# Patient Record
Sex: FEMALE | Race: ASIAN | NOT HISPANIC OR LATINO | Employment: PART TIME | URBAN - NONMETROPOLITAN AREA
[De-identification: names, ages, dates, MRNs, and addresses within clinical notes are randomized per-mention and may not be internally consistent; named-entity substitution may affect disease eponyms.]

---
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
## 2019-01-08 ENCOUNTER — OFFICE VISIT (OUTPATIENT)
Dept: PODIATRY | Facility: CLINIC | Age: 19
End: 2019-01-08

## 2019-01-08 VITALS — HEIGHT: 62 IN | OXYGEN SATURATION: 100 % | BODY MASS INDEX: 18.95 KG/M2 | WEIGHT: 103 LBS | HEART RATE: 97 BPM

## 2019-01-08 DIAGNOSIS — L60.0 INGROWN TOENAIL: ICD-10-CM

## 2019-01-08 DIAGNOSIS — M79.674 GREAT TOE PAIN, RIGHT: Primary | ICD-10-CM

## 2019-01-08 PROCEDURE — 11750 EXCISION NAIL&NAIL MATRIX: CPT | Performed by: PODIATRIST

## 2019-01-08 PROCEDURE — 99213 OFFICE O/P EST LOW 20 MIN: CPT | Performed by: PODIATRIST

## 2019-01-08 NOTE — PROGRESS NOTES
Michela Nailsty  2000  18 y.o. female     Patient presents today with complaint of ingrown toenail right great toe.      1/9/2019  Chief Complaint   Patient presents with   • Right Foot - Ingrown Toenail       History of Present Illness    Patient presents to clinic today for right great toe pain.  She states that the toenail was growing into the skin.  This started approximately 2 weeks ago.  There is pain with applied pressure.  She rates the pain as a 1 out of 10.  She has done nothing to treat it.  She has no other pedal complaints at this time.       Past Medical History:   Diagnosis Date   • Allergic    • Constipation    • GERD (gastroesophageal reflux disease)    • Headache    • Heart murmur     9 months   • Ingrown toenail    • Strep throat          Past Surgical History:   Procedure Laterality Date   • AVULSION TOENAIL PLATE           Family History   Adopted: Yes       Allergies   Allergen Reactions   • Penicillins        Social History     Socioeconomic History   • Marital status: Single     Spouse name: Not on file   • Number of children: Not on file   • Years of education: Not on file   • Highest education level: Not on file   Social Needs   • Financial resource strain: Not on file   • Food insecurity - worry: Not on file   • Food insecurity - inability: Not on file   • Transportation needs - medical: Not on file   • Transportation needs - non-medical: Not on file   Occupational History   • Not on file   Tobacco Use   • Smoking status: Never Smoker   • Smokeless tobacco: Never Used   Substance and Sexual Activity   • Alcohol use: No   • Drug use: No   • Sexual activity: No   Other Topics Concern   • Not on file   Social History Narrative   • Not on file         Current Outpatient Medications   Medication Sig Dispense Refill   • cetirizine (zyrTEC) 10 MG tablet Take 10 mg by mouth Daily.       No current facility-administered medications for this visit.      Review of Systems   Constitutional:  "Negative.    HENT: Negative.    Respiratory: Negative.    Gastrointestinal: Negative.    Musculoskeletal:        Right great toe pain    Psychiatric/Behavioral: Negative.         OBJECTIVE    Pulse 97   Ht 157.5 cm (62.01\")   Wt 46.7 kg (103 lb)   SpO2 100%   BMI 18.83 kg/m²     Physical Exam   Constitutional: She is oriented to person, place, and time. She appears well-developed and well-nourished. No distress.   HENT:   Head: Normocephalic and atraumatic.   Nose: Nose normal.   Eyes: Conjunctivae and EOM are normal. Pupils are equal, round, and reactive to light.   Pulmonary/Chest: Effort normal. No respiratory distress. She has no wheezes.   Neurological: She is alert and oriented to person, place, and time. She displays normal reflexes.   Skin: Skin is warm and dry. Capillary refill takes less than 2 seconds.   Psychiatric: She has a normal mood and affect. Her behavior is normal.   Vitals reviewed.    Right Lower extremity:     Cardiovascular:    DP/PT pulses palpable    CFT brisk  to all digits    Musculoskeletal:  Muscle strength is 5/5 for all muscle groups tested   Rectus foot type     Dermatological:   Right hallux nail is incurvated and ingrowing on the medial border.  There is erythema and edema.  It is painful to palpation.  Mild drainage noted.  Skin is warm, dry and intact    No subcutaneous nodules or masses noted      Neurological:  Sensation intact to light touch          Nail Removal  Date/Time: 1/9/2019 7:21 AM  Performed by: Arian Moy DPM  Authorized by: Arian Moy DPM   Consent: Verbal consent obtained. Written consent obtained.  Risks and benefits: risks, benefits and alternatives were discussed  Consent given by: patient  Patient understanding: patient states understanding of the procedure being performed  Patient identity confirmed: verbally with patient  Location: right foot  Location details: right big toe  Anesthesia: digital block    Anesthesia:  Local Anesthetic: " lidocaine 2% without epinephrine    Sedation:  Patient sedated: no    Preparation: skin prepped with Betadine  Amount removed: partial (Nail plate was  from underlying nailbed with blunt dissection and removed with nail nippers and a hemostat)  Side: medial  Nail matrix removed: partial (phenol)  Dressing: antibiotic ointment and dressing applied  Patient tolerance: Patient tolerated the procedure well with no immediate complications              ASSESSMENT AND PLAN    Michela was seen today for ingrown toenail.    Diagnoses and all orders for this visit:    Great toe pain, right    Ingrown toenail      - Patient with new ingrowing toenail on the opposite side of the toe from previous visit.  - Diagnosis, prevention and treatment of ingrown toenails discussed with patient, including risks and potential benefits of nail avulsion both temporary and permanent versus simple debridement.  - Patient elected for a partial permanent nail avulsion  - Dispensed aftercare instruction sheet  - All questions were answered and the patient is in agreement with the current treatment plan.  - RTC in 2 weeks           This document has been electronically signed by Arian Moy DPM on January 9, 2019 7:20 AM     1/9/2019  7:20 AM

## 2019-01-22 ENCOUNTER — OFFICE VISIT (OUTPATIENT)
Dept: PODIATRY | Facility: CLINIC | Age: 19
End: 2019-01-22

## 2019-01-22 VITALS — BODY MASS INDEX: 18.95 KG/M2 | HEIGHT: 62 IN | HEART RATE: 107 BPM | WEIGHT: 103 LBS | OXYGEN SATURATION: 97 %

## 2019-01-22 DIAGNOSIS — L60.0 INGROWN TOENAIL: Primary | ICD-10-CM

## 2019-01-22 PROCEDURE — 99212 OFFICE O/P EST SF 10 MIN: CPT | Performed by: PODIATRIST

## 2019-01-22 NOTE — PROGRESS NOTES
Michela Nuñez  2000  18 y.o. female   Not diabetic    Patient presents today for recheck of her right great toenail avulsion.    1/22/2019  Chief Complaint   Patient presents with   • Right Foot - Follow-up       History of Present Illness    Patient presents to clinic today for follow-up of her right great toenail avulsion.  She has been soaking and dressing as instructed.  She denies pain.       Past Medical History:   Diagnosis Date   • Allergic    • Constipation    • GERD (gastroesophageal reflux disease)    • Headache    • Heart murmur     9 months   • Ingrown toenail    • Strep throat          Past Surgical History:   Procedure Laterality Date   • AVULSION TOENAIL PLATE           Family History   Adopted: Yes       Allergies   Allergen Reactions   • Penicillins        Social History     Socioeconomic History   • Marital status: Single     Spouse name: Not on file   • Number of children: Not on file   • Years of education: Not on file   • Highest education level: Not on file   Social Needs   • Financial resource strain: Not on file   • Food insecurity - worry: Not on file   • Food insecurity - inability: Not on file   • Transportation needs - medical: Not on file   • Transportation needs - non-medical: Not on file   Occupational History   • Not on file   Tobacco Use   • Smoking status: Never Smoker   • Smokeless tobacco: Never Used   Substance and Sexual Activity   • Alcohol use: No   • Drug use: No   • Sexual activity: No   Other Topics Concern   • Not on file   Social History Narrative   • Not on file         Current Outpatient Medications   Medication Sig Dispense Refill   • cetirizine (zyrTEC) 10 MG tablet Take 10 mg by mouth Daily.       No current facility-administered medications for this visit.      Review of Systems   Constitutional: Negative.    HENT: Negative.    Eyes: Negative.    Respiratory: Negative.    Gastrointestinal: Negative.    Musculoskeletal: Negative.   "  Psychiatric/Behavioral: Negative.         OBJECTIVE    Pulse 107   Ht 157.5 cm (62\")   Wt 46.7 kg (103 lb)   SpO2 97%   BMI 18.84 kg/m²     Physical Exam   Constitutional: She is oriented to person, place, and time. She appears well-developed and well-nourished. No distress.   HENT:   Head: Normocephalic and atraumatic.   Nose: Nose normal.   Pulmonary/Chest: Effort normal. No respiratory distress. She has no wheezes.   Neurological: She is alert and oriented to person, place, and time.   Skin: Skin is warm and dry. Capillary refill takes less than 2 seconds.   Psychiatric: She has a normal mood and affect. Her behavior is normal.   Vitals reviewed.    Right Lower extremity:     Cardiovascular:    DP/PT pulses palpable    CFT brisk  to all digits    Musculoskeletal:  Muscle strength is 5/5 for all muscle groups tested   Rectus foot type     Dermatological:   Right hallux nail is absent.  No signs of infection.  Skin is warm, dry and intact    No subcutaneous nodules or masses noted      Neurological:  Sensation intact to light touch          Procedures        ASSESSMENT AND PLAN    Michela was seen today for follow-up.    Diagnoses and all orders for this visit:    Ingrown toenail      - Patient is doing very well post procedure  - Continue soaking and dressing the toe until there is no drainage on the Band-Aid.  - All questions were answered   - RTC as needed          This document has been electronically signed by Arian Moy DPM on January 22, 2019 1:37 PM     1/22/2019  1:37 PM   "

## 2019-02-13 PROBLEM — J98.8 VIRAL RESPIRATORY ILLNESS: Status: ACTIVE | Noted: 2019-02-13

## 2019-02-13 PROBLEM — B97.89 VIRAL RESPIRATORY ILLNESS: Status: ACTIVE | Noted: 2019-02-13

## 2020-06-19 ENCOUNTER — OFFICE VISIT (OUTPATIENT)
Dept: OBSTETRICS AND GYNECOLOGY | Facility: CLINIC | Age: 20
End: 2020-06-19

## 2020-06-19 VITALS
DIASTOLIC BLOOD PRESSURE: 78 MMHG | HEIGHT: 62 IN | SYSTOLIC BLOOD PRESSURE: 108 MMHG | BODY MASS INDEX: 19.88 KG/M2 | WEIGHT: 108 LBS

## 2020-06-19 DIAGNOSIS — Z30.011 ENCOUNTER FOR BCP (BIRTH CONTROL PILLS) INITIAL PRESCRIPTION: Primary | ICD-10-CM

## 2020-06-19 PROCEDURE — 99212 OFFICE O/P EST SF 10 MIN: CPT | Performed by: NURSE PRACTITIONER

## 2020-06-19 RX ORDER — NORETHINDRONE ACETATE AND ETHINYL ESTRADIOL 1; .02 MG/1; MG/1
1 TABLET ORAL DAILY
Qty: 21 TABLET | Refills: 12 | Status: SHIPPED | OUTPATIENT
Start: 2020-06-19 | End: 2020-09-22 | Stop reason: SDUPTHER

## 2020-06-19 NOTE — PROGRESS NOTES
Subjective   Michela Nuñez is a 19 y.o. here for birth control     LMP: 20      Michela is  who presents today for birth control initiation. She is getting  in October and wants to start on BC. Has never been sexually active. She is interested in the pill or IUD. Pt denies migraines with aura, hx of blood clots, smoking, or high blood pressure.     Contraception   This is a new problem. The current episode started today. The problem occurs constantly. The problem has been unchanged. Pertinent negatives include no abdominal pain, chest pain, chills, fatigue, fever, nausea or rash. Nothing aggravates the symptoms. She has tried nothing for the symptoms. The treatment provided no relief.       The following portions of the patient's history were reviewed and updated as appropriate: allergies, current medications, past family history, past medical history, past social history, past surgical history and problem list.    Review of Systems   Constitutional: Negative for chills, fatigue and fever.   Respiratory: Negative for shortness of breath.    Cardiovascular: Negative for chest pain and palpitations.   Gastrointestinal: Negative for abdominal pain, constipation, diarrhea and nausea.   Genitourinary: Negative for dysuria, frequency, menstrual problem, pelvic pressure, vaginal bleeding, vaginal discharge and vaginal pain.   Skin: Negative for rash.   Neurological: Negative for headache.   Psychiatric/Behavioral: Negative for depressed mood.       Objective   Physical Exam   Constitutional: She is oriented to person, place, and time. She appears well-developed and well-nourished.   HENT:   Head: Normocephalic.   Pulmonary/Chest: Effort normal.   Abdominal: Soft.   Neurological: She is alert and oriented to person, place, and time.   Skin: Skin is warm and dry.   Psychiatric: She has a normal mood and affect. Her behavior is normal.   Nursing note and vitals reviewed.        Assessment/Plan    Michela was seen today for contraception.    Diagnoses and all orders for this visit:    Encounter for BCP (birth control pills) initial prescription  -     norethindrone-ethinyl estradiol (Loestrin 1/20, 21,) 1-20 MG-MCG per tablet; Take 1 tablet by mouth Daily.        Reviewed contraceptive options to include IUDs, Nexplanon, OCPs, Patch, and Vaginal Ring. Pt opted for OCP today; instructed to to start on her first day of her next period. Return in 3 months for f/u.

## 2020-08-18 ENCOUNTER — TELEPHONE (OUTPATIENT)
Dept: OBSTETRICS AND GYNECOLOGY | Facility: CLINIC | Age: 20
End: 2020-08-18

## 2020-08-18 NOTE — TELEPHONE ENCOUNTER
Pt called and wanted to ask you a couple questions.    She would not tell me any other information. [phone]

## 2020-08-18 NOTE — TELEPHONE ENCOUNTER
Spoke to patient regarding birth control. Currently, on her 2nd day of placebo week but the pill doesn't actually have placebo pills; is wondering when she should start her the pills again. Pt began bleeding today. Instructed to start next form 1 week of when she began her placebo week. If breakthrough bleeding persists with Loestrin, can increas LAI with higher estrogen.

## 2020-09-22 ENCOUNTER — OFFICE VISIT (OUTPATIENT)
Dept: OBSTETRICS AND GYNECOLOGY | Facility: CLINIC | Age: 20
End: 2020-09-22

## 2020-09-22 VITALS
DIASTOLIC BLOOD PRESSURE: 64 MMHG | WEIGHT: 113.2 LBS | BODY MASS INDEX: 20.83 KG/M2 | HEIGHT: 62 IN | SYSTOLIC BLOOD PRESSURE: 105 MMHG

## 2020-09-22 DIAGNOSIS — Z30.41 ENCOUNTER FOR BIRTH CONTROL PILLS MAINTENANCE: ICD-10-CM

## 2020-09-22 PROCEDURE — 99212 OFFICE O/P EST SF 10 MIN: CPT | Performed by: NURSE PRACTITIONER

## 2020-09-22 RX ORDER — NORETHINDRONE ACETATE AND ETHINYL ESTRADIOL 1; .02 MG/1; MG/1
1 TABLET ORAL DAILY
Qty: 63 TABLET | Refills: 3 | Status: SHIPPED | OUTPATIENT
Start: 2020-09-22 | End: 2021-03-08 | Stop reason: SDUPTHER

## 2020-09-22 RX ORDER — NORETHINDRONE ACETATE AND ETHINYL ESTRADIOL 1; .02 MG/1; MG/1
1 TABLET ORAL DAILY
Qty: 84 TABLET | Refills: 3 | Status: SHIPPED | OUTPATIENT
Start: 2020-09-22 | End: 2020-09-22

## 2020-09-22 NOTE — PROGRESS NOTES
Subjective   Michela Nuñez is a 19 y.o. here for birth control pill followup    Michela is her for 3 month birth control [id number] loestrin. She is having slight spotting in the 3rd week of her pills and states that she will be on her period during her wedding week in October. Her withdraw period has been light.     Contraception  This is a new problem. The current episode started more than 1 month ago. The problem occurs constantly. The problem has been gradually improving. Pertinent negatives include no abdominal pain, chest pain, chills, fatigue, fever, nausea or rash. Nothing aggravates the symptoms. She has tried nothing for the symptoms. The treatment provided no relief.       The following portions of the patient's history were reviewed and updated as appropriate: allergies, current medications, past family history, past medical history, past social history, past surgical history and problem list.    Review of Systems   Constitutional: Negative for chills, fatigue and fever.   Respiratory: Negative for shortness of breath.    Cardiovascular: Negative for chest pain and palpitations.   Gastrointestinal: Negative for abdominal pain, constipation, diarrhea and nausea.   Genitourinary: Negative for dysuria, frequency, menstrual problem, pelvic pressure, vaginal bleeding, vaginal discharge and vaginal pain.   Skin: Negative for rash.   Neurological: Negative for headache.   Psychiatric/Behavioral: Negative for depressed mood.       Objective   Physical Exam  Vitals signs and nursing note reviewed.   Constitutional:       Appearance: She is well-developed.   HENT:      Head: Normocephalic.   Neck:      Musculoskeletal: Normal range of motion and neck supple.   Pulmonary:      Effort: Pulmonary effort is normal.   Musculoskeletal: Normal range of motion.   Neurological:      Mental Status: She is alert and oriented to person, place, and time.   Psychiatric:         Behavior: Behavior normal.            Assessment/Plan   Michela was seen today for follow-up.    Diagnoses and all orders for this visit:    Encounter for birth control pills maintenance  -     Discontinue: norethindrone-ethinyl estradiol (Loestrin 1/20, 21,) 1-20 MG-MCG per tablet; Take 1 tablet by mouth Daily.  -     norethindrone-ethinyl estradiol (Loestrin 1/20, 21,) 1-20 MG-MCG per tablet; Take 1 tablet by mouth Daily.        Counseled patient that she can continue on to her next pack of pills to skip a withdrawal bleed during her wedding week. Rx changed so she can get 3 months supply at a time. Counseled on returning after she gets  for her well woman and pap test- her 20st birthday is this month.

## 2021-03-08 ENCOUNTER — OFFICE VISIT (OUTPATIENT)
Dept: OBSTETRICS AND GYNECOLOGY | Facility: CLINIC | Age: 21
End: 2021-03-08

## 2021-03-08 VITALS
WEIGHT: 121 LBS | DIASTOLIC BLOOD PRESSURE: 62 MMHG | BODY MASS INDEX: 22.26 KG/M2 | SYSTOLIC BLOOD PRESSURE: 112 MMHG | HEIGHT: 62 IN

## 2021-03-08 DIAGNOSIS — Z01.419 WOMEN'S ANNUAL ROUTINE GYNECOLOGICAL EXAMINATION: Primary | ICD-10-CM

## 2021-03-08 DIAGNOSIS — Z30.41 ENCOUNTER FOR BIRTH CONTROL PILLS MAINTENANCE: ICD-10-CM

## 2021-03-08 PROCEDURE — 99395 PREV VISIT EST AGE 18-39: CPT | Performed by: NURSE PRACTITIONER

## 2021-03-08 RX ORDER — NORETHINDRONE ACETATE AND ETHINYL ESTRADIOL 1; .02 MG/1; MG/1
1 TABLET ORAL DAILY
Qty: 63 TABLET | Refills: 4 | Status: SHIPPED | OUTPATIENT
Start: 2021-03-08 | End: 2022-01-11

## 2021-03-08 NOTE — PROGRESS NOTES
Subjective   Michela Nuñez is a 20 y.o. Annual gynecological exam    LMP: 02/23/2021  Pap: Never  BC: Loestrin    Pt presents for annual gynecological exam with no complaints.  She is doing well on oral contraception and denies any undesirable side effects.      Gynecologic Exam  The patient's pertinent negatives include no genital itching, genital lesions, genital odor, genital rash, pelvic pain, vaginal bleeding or vaginal discharge. The patient is experiencing no pain. She is not pregnant. Pertinent negatives include no abdominal pain, chills, constipation, diarrhea, dysuria, fever, flank pain, frequency, headaches, hematuria, rash or urgency. She is sexually active. No, her partner does not have an STD. She uses oral contraceptives for contraception. Her menstrual history has been regular.       The following portions of the patient's history were reviewed and updated as appropriate: allergies, current medications, past family history, past medical history, past social history, past surgical history and problem list.    Review of Systems   Constitutional: Negative for chills, diaphoresis, fatigue, fever and unexpected weight change.   Respiratory: Negative for apnea, chest tightness and shortness of breath.    Cardiovascular: Negative for chest pain and palpitations.   Gastrointestinal: Negative for abdominal distention, abdominal pain, constipation and diarrhea.   Genitourinary: Negative for decreased urine volume, difficulty urinating, dyspareunia, dysuria, enuresis, flank pain, frequency, genital sores, hematuria, menstrual problem, pelvic pain, urgency, vaginal bleeding, vaginal discharge and vaginal pain.   Skin: Negative for rash.   Neurological: Negative for headaches.   Psychiatric/Behavioral: Negative for sleep disturbance and suicidal ideas.         Objective   Physical Exam  Vitals and nursing note reviewed. Exam conducted with a chaperone present.   Constitutional:       General: She is  awake. She is not in acute distress.     Appearance: Normal appearance. She is well-developed and well-groomed. She is not ill-appearing, toxic-appearing or diaphoretic.   Neck:      Thyroid: No thyroid mass, thyromegaly or thyroid tenderness.   Cardiovascular:      Rate and Rhythm: Normal rate and regular rhythm.      Heart sounds: Normal heart sounds.   Pulmonary:      Effort: Pulmonary effort is normal.      Breath sounds: Normal breath sounds.   Chest:      Breasts: Adam Score is 5. Breasts are symmetrical.         Right: Normal. No swelling, bleeding, inverted nipple, mass, nipple discharge, skin change or tenderness.         Left: Normal. No swelling, bleeding, inverted nipple, mass, nipple discharge, skin change or tenderness.   Abdominal:      General: Bowel sounds are normal. There is no distension.      Palpations: Abdomen is soft.      Tenderness: There is no abdominal tenderness.   Genitourinary:     General: Normal vulva.      Exam position: Lithotomy position.      Adam stage (genital): 5.      Labia:         Right: No rash, tenderness, lesion or injury.         Left: No rash, tenderness, lesion or injury.       Urethra: No prolapse, urethral pain, urethral swelling or urethral lesion.      Vagina: Normal.      Cervix: Normal.      Uterus: Normal.       Adnexa: Right adnexa normal and left adnexa normal.        Right: No mass, tenderness or fullness.          Left: No mass, tenderness or fullness.        Comments: Pap smear obtained  Lymphadenopathy:      Upper Body:      Right upper body: No supraclavicular, axillary or pectoral adenopathy.      Left upper body: No supraclavicular, axillary or pectoral adenopathy.      Lower Body: No right inguinal adenopathy. No left inguinal adenopathy.   Skin:     General: Skin is warm and dry.   Neurological:      Mental Status: She is alert and oriented to person, place, and time.      Gait: Gait is intact.   Psychiatric:         Attention and Perception:  Attention and perception normal.         Mood and Affect: Mood and affect normal.         Speech: Speech normal.         Behavior: Behavior normal. Behavior is cooperative.           Assessment/Plan   Diagnoses and all orders for this visit:    1. Women's annual routine gynecological examination (Primary)  -     Liquid-based Pap Smear, Screening    2. Encounter for birth control pills maintenance  -     norethindrone-ethinyl estradiol (Loestrin 1/20, 21,) 1-20 MG-MCG per tablet; Take 1 tablet by mouth Daily.  Dispense: 63 tablet; Refill: 4      Encouraged self breast awareness.  She will receive normal pap letter via mail, if abnormal we will call pt.  RTC in 1 year for annual gynecological exam or sooner if needed.

## 2021-03-09 LAB
LAB AP CASE REPORT: NORMAL
PATH INTERP SPEC-IMP: NORMAL

## 2021-06-01 ENCOUNTER — OFFICE VISIT (OUTPATIENT)
Dept: FAMILY MEDICINE CLINIC | Facility: CLINIC | Age: 21
End: 2021-06-01

## 2021-06-01 VITALS
OXYGEN SATURATION: 98 % | HEIGHT: 62 IN | BODY MASS INDEX: 22.26 KG/M2 | HEART RATE: 95 BPM | WEIGHT: 121 LBS | DIASTOLIC BLOOD PRESSURE: 68 MMHG | SYSTOLIC BLOOD PRESSURE: 104 MMHG

## 2021-06-01 DIAGNOSIS — K21.9 GASTROESOPHAGEAL REFLUX DISEASE, UNSPECIFIED WHETHER ESOPHAGITIS PRESENT: Primary | ICD-10-CM

## 2021-06-01 PROCEDURE — 99213 OFFICE O/P EST LOW 20 MIN: CPT | Performed by: NURSE PRACTITIONER

## 2021-06-01 RX ORDER — OMEPRAZOLE 40 MG/1
40 CAPSULE, DELAYED RELEASE ORAL DAILY
Qty: 30 CAPSULE | Refills: 2 | Status: SHIPPED | OUTPATIENT
Start: 2021-06-01 | End: 2021-10-18

## 2021-06-01 NOTE — PROGRESS NOTES
Chief Complaint  Heartburn    Subjective          Michela Nuñez presents to [facility] Patient was recently seen in urgent care, was given Carafate, some relief, but still having heartburn, feeling of something stuck in her throat and sulfur burps. She is currently taking 20mg of Prilosec, and just finished her carafate.   Heartburn  She complains of belching, globus sensation, heartburn and nausea. She reports no chest pain, no coughing, no sore throat or no wheezing. This is a recurrent problem. The current episode started 1 to 4 weeks ago. The problem occurs frequently. The problem has been waxing and waning. The heartburn is located in the substernum. The heartburn is of moderate intensity. Nothing aggravates the symptoms. There are no known risk factors. She has tried a PPI for the symptoms. The treatment provided mild relief. Past procedures include an EGD. 10+ years ago, gastritis .       Review of Systems   Constitutional: Negative for activity change, appetite change and chills.   HENT: Negative for congestion, ear pain, sore throat and trouble swallowing.    Eyes: Negative for discharge, itching and visual disturbance.   Respiratory: Negative for apnea, cough and wheezing.    Cardiovascular: Negative for chest pain and leg swelling.   Gastrointestinal: Positive for heartburn and nausea. Negative for abdominal distention, constipation and diarrhea.   Endocrine: Negative for cold intolerance, heat intolerance and polyuria.   Genitourinary: Negative for dysuria, frequency and urgency.   Musculoskeletal: Negative for arthralgias, back pain and myalgias.   Skin: Negative for color change, pallor and wound.   Neurological: Negative for dizziness, seizures, syncope, weakness and light-headedness.   Psychiatric/Behavioral: Negative for agitation, confusion and sleep disturbance. The patient is not nervous/anxious.          Objective   Vital Signs:   /68   Pulse 95    "Ht 157.5 cm (62\")   Wt 54.9 kg (121 lb)   SpO2 98%   BMI 22.13 kg/m²     Physical Exam  Vitals and nursing note reviewed.   Constitutional:       Appearance: She is well-developed.   HENT:      Head: Normocephalic and atraumatic.   Eyes:      General: Lids are normal.      Conjunctiva/sclera: Conjunctivae normal.   Neck:      Thyroid: No thyroid mass or thyromegaly.      Trachea: Trachea normal. No tracheal tenderness.   Cardiovascular:      Rate and Rhythm: Normal rate.      Pulses: Normal pulses.      Heart sounds: Normal heart sounds.   Pulmonary:      Effort: Pulmonary effort is normal. No respiratory distress.      Breath sounds: Normal breath sounds. No wheezing.   Abdominal:      General: There is no distension.      Palpations: Abdomen is soft. There is no mass.   Musculoskeletal:         General: Normal range of motion.      Cervical back: Normal range of motion. No edema.   Lymphadenopathy:      Head:      Right side of head: No submental, submandibular or tonsillar adenopathy.      Left side of head: No submental, submandibular or tonsillar adenopathy.   Skin:     General: Skin is warm and dry.      Coloration: Skin is not pale.      Findings: No abrasion, erythema or lesion.   Neurological:      Mental Status: She is alert and oriented to person, place, and time.   Psychiatric:         Mood and Affect: Mood is not anxious. Affect is not inappropriate.         Speech: Speech normal.         Behavior: Behavior normal.         Thought Content: Thought content normal.         Judgment: Judgment normal. Judgment is not impulsive.        Result Review :                  Assessment and Plan    Diagnoses and all orders for this visit:    1. Gastroesophageal reflux disease, unspecified whether esophagitis present (Primary)  -     omeprazole (priLOSEC) 40 MG capsule; Take 1 capsule by mouth Daily.  Dispense: 30 capsule; Refill: 2  -     Ambulatory Referral to Gastroenterology      1. GERD  Increase Prilosec to " 40mg, once daily   Referral to Gi made, if no relief in 2-3 weeks   We discussed trying Nexium of no relief with increased  PPI, please call office if you have issues  Refrain from eating late at night, or laying flat after eating.       I spent 28 minutes caring for Michela on this date of service. This time includes time spent by me in the following activities:preparing for the visit, performing a medically appropriate examination and/or evaluation , counseling and educating the patient/family/caregiver, ordering medications, tests, or procedures, referring and communicating with other health care professionals  and documenting information in the medical record  Follow Up   Return if symptoms worsen or fail to improve, for Next scheduled follow up.  Patient was given instructions and counseling regarding her condition or for health maintenance advice. Please see specific information pulled into the AVS if appropriate.           This document has been electronically signed by KAREN Salazar on June 1, 2021 11:26 CDT

## 2021-06-15 ENCOUNTER — OFFICE VISIT (OUTPATIENT)
Dept: GASTROENTEROLOGY | Facility: CLINIC | Age: 21
End: 2021-06-15

## 2021-06-15 VITALS
HEIGHT: 62 IN | HEART RATE: 97 BPM | WEIGHT: 120 LBS | DIASTOLIC BLOOD PRESSURE: 79 MMHG | SYSTOLIC BLOOD PRESSURE: 122 MMHG | BODY MASS INDEX: 22.08 KG/M2

## 2021-06-15 DIAGNOSIS — K21.9 GASTROESOPHAGEAL REFLUX DISEASE, UNSPECIFIED WHETHER ESOPHAGITIS PRESENT: Primary | ICD-10-CM

## 2021-06-15 PROCEDURE — 99213 OFFICE O/P EST LOW 20 MIN: CPT | Performed by: NURSE PRACTITIONER

## 2021-06-15 RX ORDER — DEXTROSE AND SODIUM CHLORIDE 5; .45 G/100ML; G/100ML
30 INJECTION, SOLUTION INTRAVENOUS CONTINUOUS PRN
Status: CANCELLED | OUTPATIENT
Start: 2021-07-30

## 2021-06-15 NOTE — PATIENT INSTRUCTIONS
Food Choices for Gastroesophageal Reflux Disease, Adult  When you have gastroesophageal reflux disease (GERD), the foods you eat and your eating habits are very important. Choosing the right foods can help ease the discomfort of GERD. Consider working with a dietitian to help you make healthy food choices.  What are tips for following this plan?  Reading food labels  · Read the label for foods that are low in saturated fat. Foods that have less than 5% of daily value (DV) of fat and 0 g of trans fats may help with your symptoms.  Cooking  · Cook foods using methods other than frying. This may include baking, steaming, grilling, or broiling. These are all methods that do not need a lot of fat for cooking.  · To add flavor, try to use herbs that are low in spice and acidity.  Meal planning    · Choose healthy foods that are low in fat, such as fruits, vegetables, whole grains, low-fat dairy products, lean meats, fish, and poultry.  · Eat frequent, small meals instead of three large meals each day. Eat your meals slowly, in a relaxed setting. Avoid bending over or lying down until 2-3 hours after eating.  · Limit high-fat foods such as fatty meats or fried foods.  · Limit your intake of oils, butter, and shortening to less than 8 teaspoons each day.  · Avoid the following:  ? Foods that cause symptoms. These may be different for different people. Keep a food diary to keep track of foods that cause symptoms.  ? Alcohol.  ? Drinking large amounts of liquid with meals.  ? Eating meals during the 2-3 hours before bed.  Lifestyle  · Maintain a healthy weight. Ask your health care provider what weight is healthy for you. If you need to lose weight, work with your health care provider to do so safely.  · Exercise for at least 30 minutes on 5 or more days each week, or as told by your health care provider.  · Avoid wearing clothes that fit tightly around your waist and chest.  · Do not use any products that contain nicotine or  tobacco, such as cigarettes, e-cigarettes, and chewing tobacco. If you need help quitting, ask your health care provider.  · Sleep with the head of your bed raised. Use a wedge under the mattress or blocks under the bed frame to raise the head of the bed.  What foods should I eat?    Eat a healthy, well-balanced diet of fruits, vegetables, whole grains, low-fat dairy products, lean meats, fish, and poultry. Each person is different. Foods that may trigger symptoms in one person may not trigger any symptoms in another person. Work with your health care provider to identify foods that are safe for you.  The items listed above may not be a complete list of foods and beverages you can eat. Contact a dietitian for more information.  What foods should I avoid?  Limiting some of these foods may help in managing the symptoms of GERD. Everyone is different. Consult a dietitian or your health care provider to help you identify the exact foods to avoid, if any.  Fruits  Any fruits prepared with added fat. Any fruits that cause symptoms. For some people this may include citrus fruits, such as oranges, grapefruit, pineapple, and blair.  Vegetables  Deep-fried vegetables. French fries. Any vegetables prepared with added fat. Any vegetables that cause symptoms. For some people, this may include tomatoes and tomato products, chili peppers, onions and garlic, and horseradish.  Grains  Pastries or quick breads with added fat.  Meats and other proteins  High-fat meats, such as fatty beef or pork, hot dogs, ribs, ham, sausage, salami, and lazo. Fried meat or protein, including fried fish and fried chicken. Nuts and nut butters.  Dairy  Whole milk and chocolate milk. Sour cream. Cream. Ice cream. Cream cheese. Milkshakes.  Fats and oils  Butter. Margarine. Shortening. Ghee.  Beverages  Coffee and tea, with or without caffeine. Carbonated beverages. Sodas. Energy drinks. Fruit juice made with acidic fruits (such as orange or  grapefruit). Tomato juice. Alcoholic drinks.  Sweets and desserts  Chocolate and cocoa. Donuts.  Seasonings and condiments  Pepper. Peppermint and spearmint. Added salt. Any condiments, herbs, or seasonings that cause symptoms. For some people, this may include mike, hot sauce, or vinegar-based salad dressings.  The items listed above may not be a complete list of foods and beverages you should avoid. Contact a dietitian for more information.  Questions to ask your health care provider  Diet and lifestyle changes are usually the first steps that are taken to manage symptoms of GERD. If diet and lifestyle changes do not improve your symptoms, talk with your health care provider about taking medicines.  Where to find more information  · [employer] G[employer]intestinal [employer]: aboutgerd.org  Summary  · When you have gastroesophageal reflux disease (GERD), food and lifestyle choices may be very helpful in easing the discomfort of GERD.  · Eat frequent, small meals instead of three large meals each day. Eat your meals slowly, in a relaxed setting. Avoid bending over or lying down until 2-3 hours after eating.  · Limit high-fat foods such as fatty meat or fried foods.  This information is not intended to replace advice given to you by your health care provider. Make sure you discuss any questions you have with your health care provider.  Document Revised: 10/12/2020 Document Reviewed: 10/12/2020  Elsevier Patient Education © 2021 [employer]

## 2021-06-15 NOTE — PROGRESS NOTES
Chief Complaint   Patient presents with   • Heartburn       Subjective    Michela Nuñez is a 20 y.o. female. she is here today     20-year-old female presents to discuss worsening reflux.  She was seen in urgent care due to flare of acid indigestion given Carafate with only mild relief of symptoms continued  having frequent indigestion/heartburn seen by PCP due to feeling of globus sensation and sulfur burps and continual acid indigestion.  Prilosec was increased to 40 mg per night and she reports significant provement of symptoms over the last week.  She reports she had similar issue diagnosed with gastritis at age 9 or 10 in [place] was treated for short course with antacid medication and she has done well with diet modifications since then.    Heartburn  She complains of abdominal pain and heartburn. She reports no belching, no chest pain, no choking, no coughing, no dysphagia, no early satiety, no globus sensation, no hoarse voice or no nausea. This is a chronic problem. The problem has been gradually improving. Pertinent negatives include no fatigue. She has tried a PPI for the symptoms. The treatment provided significant relief.            The following portions of the patient's history were reviewed and updated as appropriate:   Past Medical History:   Diagnosis Date   • Allergic    • Constipation    • GERD (gastroesophageal reflux disease)    • Headache    • Heart murmur     9 months   • Ingrown toenail    • Strep throat      Past Surgical History:   Procedure Laterality Date   • AVULSION TOENAIL PLATE       Family History   Adopted: Yes     OB History    No obstetric history on file.       Prior to Admission medications    Medication Sig Start Date End Date Taking? Authorizing Provider   cetirizine (zyrTEC) 10 MG tablet Take 10 mg by mouth Daily.   Yes Provider, MD Milvia   norethindrone-ethinyl estradiol (Loestrin 1/20, 21,) 1-20 MG-MCG per tablet Take 1 tablet by mouth Daily. 3/8/21  "3/8/22 Yes Yodit Rick APRN   omeprazole (priLOSEC) 40 MG capsule Take 1 capsule by mouth Daily. 6/1/21  Yes Twyla Dueñas APRN     Allergies   Allergen Reactions   • Penicillins Hives     Social History     Socioeconomic History   • Marital status:      Spouse name: Not on file   • Number of children: Not on file   • Years of education: Not on file   • Highest education level: Not on file   Tobacco Use   • Smoking status: Never Smoker   • Smokeless tobacco: Never Used   Substance and Sexual Activity   • Alcohol use: No   • Drug use: No   • Sexual activity: Never       Review of Systems  Review of Systems   Constitutional: Negative for activity change, appetite change, chills, diaphoresis, fatigue, fever and unexpected weight change.   HENT: Negative for hoarse voice.    Respiratory: Negative for cough and choking.    Cardiovascular: Negative for chest pain.   Gastrointestinal: Positive for abdominal pain and heartburn. Negative for abdominal distention, anal bleeding, blood in stool, constipation, diarrhea, dysphagia, nausea, rectal pain and vomiting.        /79 (BP Location: Left arm)   Pulse 97   Ht 157.5 cm (62\")   Wt 54.4 kg (120 lb)   LMP 05/18/2021 (Exact Date)   BMI 21.95 kg/m²     Objective    Physical Exam  Constitutional:       General: She is not in acute distress.     Appearance: Normal appearance. She is well-developed.   HENT:      Head: Normocephalic and atraumatic.   Neck:      Thyroid: No thyromegaly.   Pulmonary:      Effort: Pulmonary effort is normal.   Abdominal:      General: Bowel sounds are normal. There is no distension.      Palpations: Abdomen is soft. Abdomen is not rigid.      Tenderness: There is abdominal tenderness in the left upper quadrant. There is no guarding.      Hernia: No hernia is present.   Musculoskeletal:      Cervical back: Normal range of motion and neck supple.   Skin:     General: Skin is warm and dry.      Coloration: Skin is not " pale.      Findings: No rash.   Neurological:      Mental Status: She is alert and oriented to person, place, and time.   Psychiatric:         Speech: Speech normal.         Behavior: Behavior is cooperative.       Office Visit on 03/08/2021   Component Date Value Ref Range Status   • Case Report 03/08/2021    Final                    Value:Gynecologic Cytology Report                       Case: [id number]                                  Authorizing Provider:  Yodit Rick       Collected:           03/08/2021 09:41 AM                                 KAREN Gilmore                                                                 Ordering Location:     [facility]     Received:            03/08/2021 01:27 PM                                 [facility]                                                                 First Screen:          Deven Pritchard                                                             Specimen:    Sendout to [facility], Cervix                                                                    • Interpretation 03/08/2021 See attached report   Final     Assessment/Plan      1. Gastroesophageal reflux disease, unspecified whether esophagitis present    .   Continue Prilosec 40 mg/day will add a patient for EGD due to left upper quadrant tenderness on exam concern for peptic ulcer or H. pylori gastritis.  Continue with standard antireflux measures and avoidance of gastric irritants.  Follow-up after test return office sooner.    Orders placed during this encounter include:  Orders Placed This Encounter   Procedures   • Follow Anesthesia Guidelines / Standing Orders     Standing Status:   Future   • Obtain Informed Consent     Standing Status:   Future     Order Specific Question:   Informed Consent Given For     Answer:   ESOPHAGOGASTRODUODENOSCOPY       ESOPHAGOGASTRODUODENOSCOPY (N/A)    Review and/or summary of lab tests, radiology, procedures, medications. Review and summary of  old records and obtaining of history. The risks and benefits of my recommendations, as well as other treatment options were discussed with the patient today. Questions were answered.    No orders of the defined types were placed in this encounter.      Follow-up: Return in about 4 weeks (around 7/13/2021) for Recheck, After test.          This document has been electronically signed by KAREN Raya on Debi 15, 2021 16:19 CDT           I spent 18 minutes caring for Michela on this date of service. This time includes time spent by me in the following activities:preparing for the visit, reviewing tests, obtaining and/or reviewing a separately obtained history, performing a medically appropriate examination and/or evaluation , counseling and educating the patient/family/caregiver, ordering medications, tests, or procedures, referring and communicating with other health care professionals , documenting information in the medical record and care coordination    Results for orders placed or performed in visit on 03/08/21   Liquid-based Pap Smear, Screening    Specimen: Cervix; ThinPrep Vial   Result Value Ref Range    Case Report       Gynecologic Cytology Report                       Case: [id number]                                  Authorizing Provider:  Yodit Rick       Collected:           03/08/2021 09:41 AM                                 KAREN Gilmore                                                                 Ordering Location[facility]     [facility]     Received:            03/08/2021 01:27 PM                                 [facility]                                                                 First Screen:          Deven Pritchard                                                             Specimen:    Sendout to [facility], Cervix                                                                     Interpretation See attached report    Results for orders placed or performed during the  hospital encounter of 12/14/20   POCT Urine Micro    Specimen: Urine   Result Value Ref Range    WBC, UA 21-30 (A) None Seen /HPF    RBC, UA 6-12 (A) None Seen /HPF    Bacteria, UA 1+ (A) None Seen /HPF   POC Urinalysis Dipstick, Multipro (Automated dipstick)    Specimen: Urine   Result Value Ref Range    Color Yellow Yellow, Straw, Dark Yellow, Nevaeh    Clarity, UA Clear Clear    Glucose, UA Negative Negative, 1000 mg/dL (3+) mg/dL    Bilirubin Negative Negative    Ketones, UA Negative Negative    Specific Gravity  1.020 1.005 - 1.030    Blood, UA 1+ (A) Negative    pH, Urine 6.0 5.0 - 8.0    Protein, POC Negative Negative mg/dL    Urobilinogen, UA Normal Normal    Nitrite, UA Negative Negative    Leukocytes Negative Negative   Results for orders placed or performed during the hospital encounter of 03/03/20   POCT Influenza A/B    Specimen: Swab   Result Value Ref Range    Rapid Influenza A Ag Negative Negative    Rapid Influenza B Ag Negative Negative    Internal Control Passed Passed    Lot Number [id number]     Expiration Date 5/31/2021    POCT Infectious Mononucleosis    Specimen: Blood   Result Value Ref Range    Monospot Negative Negative    Internal Control Passed Passed    Lot Number [id number]     Expiration Date 7/31/2020    Results for orders placed or performed during the hospital encounter of 02/13/19   POCT Rapid Strep A    Specimen: Swab   Result Value Ref Range    Rapid Strep A Screen Negative Negative, VALID, INVALID, Not Performed    Internal Control Passed Passed    Lot Number [id number]     Expiration Date 03/31/20    Results for orders placed or performed in visit on 06/28/18   Treadmill Stress Test   Result Value Ref Range    BH CV STRESS PROTOCOL 1 Jono     Stage 1 1     HR Stage 1 117     BP Stage 1 136/75     Duration Min Stage 1 3     Duration Sec Stage 1 0     Grade Stage 1 10     Speed Stage 1 1.7     BH CV STRESS METS STAGE 1 5     Stage 2 2     HR Stage 2 130     BP Stage 2 128/72      Duration Min Stage 2 3     Duration Sec Stage 2 0     Grade Stage 2 12     Speed Stage 2 2.5     BH CV STRESS METS STAGE 2 7.5     Stage 3 3     HR Stage 3 162     BP Stage 3 131/71     Duration Min Stage 3 3     Duration Sec Stage 3 0     Grade Stage 3 14     Speed Stage 3 3.4     BH CV STRESS METS STAGE 3 10.0     Stage 4 4     HR Stage 4 179     BP Stage 4 131/71     Duration Min Stage 4 3     Duration Sec Stage 4 0     Grade Stage 4 16     Speed Stage 4 4.2     BH CV STRESS METS STAGE 4 13.5     Baseline  bpm    Baseline /76 mmHg    Peak  bpm    Percent Max Pred HR 88.61 %    Percent Target  %    Peak /75 mmHg    Recovery  bpm    Recovery /77 mmHg    Target HR (85%) 172 bpm    Max. Pred. HR (100%) 202 bpm    Exercise duration (min) 10 min    Exercise duration (sec) 0 sec    Estimated workload 13.4 METS   Adult Transthoracic Echo Complete W/ Cont if Necessary Per Protocol   Result Value Ref Range    BSA 1.4 m^2    RVIDd 1.7 cm    IVSd 0.7 cm    LVIDd 3.6 cm    LVIDs 2.3 cm    LVPWd 0.6 cm    IVS/LVPW 1.2     FS 36.1 %    EDV(Teich) 54.4 ml    ESV(Teich) 18.1 ml    EF(Teich) 66.7 %    EDV(cubed) 46.7 ml    ESV(cubed) 12.2 ml    EF(cubed) 73.9 %    LV mass(C)d 59.7 grams    LV mass(C)dI 41.4 grams/m^2    SV(Teich) 36.3 ml    SI(Teich) 25.2 ml/m^2    SV(cubed) 34.5 ml    SI(cubed) 23.9 ml/m^2    Ao root diam 2.6 cm    Ao root area 5.3 cm^2    ACS 1.5 cm    LA dimension 2.1 cm    asc Aorta Diam 2.0 cm    Ao Isth Diam 1.5 cm    LA/Ao 0.81     LVOT diam 1.8 cm    LVOT area 2.5 cm^2    LVOT area(traced) 2.5 cm^2    LVLd ap4 7.3 cm    EDV(MOD-sp4) 63.0 ml    LVLs ap4 5.8 cm    ESV(MOD-sp4) 24.0 ml    EF(MOD-sp4) 61.9 %    LVLd ap2 8.4 cm    EDV(MOD-sp2) 70.0 ml    LVLs ap2 6.9 cm    ESV(MOD-sp2) 26.0 ml    EF(MOD-sp2) 62.9 %    SV(MOD-sp4) 39.0 ml    SI(MOD-sp4) 27.1 ml/m^2    SV(MOD-sp2) 44.0 ml    SI(MOD-sp2) 30.5 ml/m^2    Ao root area (BSA corrected) 1.8     EF - Contrast  (2Ch) 62.9 ml/m^2    EF - Contrast (4Ch) 61.9 ml/m^2    LV Dewey Vol (BSA corrected) 43.7 ml/m^2    LV Sys Vol (BSA corrected) 16.6 ml/m^2    MV E max caroline 102.0 cm/sec    MV A max caroline 62.2 cm/sec    MV E/A 1.6     MV V2 max 95.2 cm/sec    MV max PG 3.6 mmHg    MV V2 mean 66.2 cm/sec    MV mean PG 2.0 mmHg    MV V2 VTI 17.8 cm    MVA(VTI) 2.4 cm^2    MV P1/2t max caroline 95.9 cm/sec    MV P1/2t 36.0 msec    MVA(P1/2t) 6.1 cm^2    MV dec slope 780.0 cm/sec^2    Ao pk caroline 130.0 cm/sec    Ao max PG 6.8 mmHg    Ao max PG (full) 3.3 mmHg    Ao V2 mean 88.9 cm/sec    Ao mean PG 4.0 mmHg    Ao mean PG (full) 2.0 mmHg    Ao V2 VTI 25.7 cm    TOMAS(I,A) 1.7 cm^2    TOMAS(I,D) 1.7 cm^2    TOMAS(V,A) 1.8 cm^2    TOMAS(V,D) 1.8 cm^2    LV V1 max PG 3.4 mmHg    LV V1 mean PG 2.0 mmHg    LV V1 max 92.8 cm/sec    LV V1 mean 64.1 cm/sec    LV V1 VTI 17.1 cm    SV(Ao) 136.4 ml    SI(Ao) 94.6 ml/m^2    SV(LVOT) 43.5 ml    SI(LVOT) 30.2 ml/m^2    PA V2 max 79.9 cm/sec    PA max PG 2.6 mmHg    PI end-d caroline 45.8 cm/sec    TR max caroline 184.0 cm/sec    MVA P1/2T LCG 2.3 cm^2     CV ECHO GEO - BZI_BMI 18.8 kilograms/m^2     CV ECHO GEO - BSA(HAYCOCK) 1.4 m^2     CV ECHO GEO - BZI_METRIC_WEIGHT 46.7 kg     CV ECHO GEO - BZI_METRIC_HEIGHT 157.5 cm    Target HR (85%) 172 bpm    Max. Pred. HR (100%) 202 bpm    Echo EF Estimated 56 %     *Note: Due to a large number of results and/or encounters for the requested time period, some results have not been displayed. A complete set of results can be found in Results Review.

## 2021-07-27 ENCOUNTER — LAB (OUTPATIENT)
Dept: LAB | Facility: HOSPITAL | Age: 21
End: 2021-07-27

## 2021-07-27 DIAGNOSIS — Z01.818 PREOP TESTING: Primary | ICD-10-CM

## 2021-07-27 LAB — SARS-COV-2 N GENE RESP QL NAA+PROBE: NOT DETECTED

## 2021-07-27 PROCEDURE — C9803 HOPD COVID-19 SPEC COLLECT: HCPCS

## 2021-07-27 PROCEDURE — 87635 SARS-COV-2 COVID-19 AMP PRB: CPT

## 2021-07-30 ENCOUNTER — HOSPITAL ENCOUNTER (OUTPATIENT)
Facility: HOSPITAL | Age: 21
Setting detail: HOSPITAL OUTPATIENT SURGERY
Discharge: HOME | End: 2021-07-30
Attending: INTERNAL MEDICINE | Admitting: INTERNAL MEDICINE

## 2021-07-30 ENCOUNTER — ANESTHESIA EVENT (OUTPATIENT)
Dept: GASTROENTEROLOGY | Facility: HOSPITAL | Age: 21
End: 2021-07-30

## 2021-07-30 ENCOUNTER — ANESTHESIA (OUTPATIENT)
Dept: GASTROENTEROLOGY | Facility: HOSPITAL | Age: 21
End: 2021-07-30

## 2021-07-30 VITALS
SYSTOLIC BLOOD PRESSURE: 102 MMHG | WEIGHT: 120.15 LBS | RESPIRATION RATE: 18 BRPM | OXYGEN SATURATION: 96 % | BODY MASS INDEX: 22.11 KG/M2 | TEMPERATURE: 97.9 F | HEART RATE: 76 BPM | DIASTOLIC BLOOD PRESSURE: 60 MMHG | HEIGHT: 62 IN

## 2021-07-30 DIAGNOSIS — K21.9 GASTROESOPHAGEAL REFLUX DISEASE, UNSPECIFIED WHETHER ESOPHAGITIS PRESENT: ICD-10-CM

## 2021-07-30 LAB — B-HCG UR QL: NEGATIVE

## 2021-07-30 PROCEDURE — 81025 URINE PREGNANCY TEST: CPT | Performed by: INTERNAL MEDICINE

## 2021-07-30 PROCEDURE — 43239 EGD BIOPSY SINGLE/MULTIPLE: CPT | Performed by: INTERNAL MEDICINE

## 2021-07-30 PROCEDURE — 25010000002 PROPOFOL 10 MG/ML EMULSION: Performed by: NURSE ANESTHETIST, CERTIFIED REGISTERED

## 2021-07-30 RX ORDER — PROPOFOL 10 MG/ML
VIAL (ML) INTRAVENOUS AS NEEDED
Status: DISCONTINUED | OUTPATIENT
Start: 2021-07-30 | End: 2021-07-30 | Stop reason: SURG

## 2021-07-30 RX ORDER — DEXTROSE AND SODIUM CHLORIDE 5; .45 G/100ML; G/100ML
30 INJECTION, SOLUTION INTRAVENOUS CONTINUOUS PRN
Status: DISCONTINUED | OUTPATIENT
Start: 2021-07-30 | End: 2021-07-30 | Stop reason: HOSPADM

## 2021-07-30 RX ORDER — LIDOCAINE HYDROCHLORIDE 20 MG/ML
INJECTION, SOLUTION INTRAVENOUS AS NEEDED
Status: DISCONTINUED | OUTPATIENT
Start: 2021-07-30 | End: 2021-07-30 | Stop reason: SURG

## 2021-07-30 RX ADMIN — DEXTROSE AND SODIUM CHLORIDE 30 ML/HR: 5; 450 INJECTION, SOLUTION INTRAVENOUS at 12:30

## 2021-07-30 RX ADMIN — PROPOFOL 30 MG: 10 INJECTION, EMULSION INTRAVENOUS at 13:38

## 2021-07-30 RX ADMIN — LIDOCAINE HYDROCHLORIDE 80 MG: 20 INJECTION, SOLUTION INTRAVENOUS at 13:37

## 2021-07-30 RX ADMIN — PROPOFOL 100 MG: 10 INJECTION, EMULSION INTRAVENOUS at 13:37

## 2021-07-30 RX ADMIN — PROPOFOL 30 MG: 10 INJECTION, EMULSION INTRAVENOUS at 13:40

## 2021-07-30 NOTE — ANESTHESIA POSTPROCEDURE EVALUATION
Patient: Michela Nuñez    Procedure Summary     Date: 07/30/21 Room / Location: [facility] ENDOSCOPY 1 / [facility] ENDOSCOPY    Anesthesia Start: 1333 Anesthesia Stop: 1343    Procedure: ESOPHAGOGASTRODUODENOSCOPY (N/A ) Diagnosis:       Gastroesophageal reflux disease, unspecified whether esophagitis present      (Gastroesophageal reflux disease, unspecified whether esophagitis present [K21.9])    Surgeons: Bambi Carmona MD Provider: Bari Atkinson CRNA    Anesthesia Type: MAC ASA Status: 2          Anesthesia Type: MAC    Vitals  No vitals data found for the desired time range.          Post Anesthesia Care and Evaluation    Patient location during evaluation: bedside  Patient participation: waiting for patient participation  Level of consciousness: responsive to verbal stimuli  Pain management: adequate  Airway patency: patent  Anesthetic complications: No anesthetic complications  PONV Status: none  Cardiovascular status: acceptable  Respiratory status: acceptable  Hydration status: acceptable    Comments:   ---------------------------

## 2021-07-30 NOTE — ANESTHESIA PREPROCEDURE EVALUATION
Anesthesia Evaluation     NPO Solid Status: > 8 hours  NPO Liquid Status: > 2 hours           Airway   Mallampati: II  TM distance: >3 FB  Neck ROM: full  no difficulty expected  Dental - normal exam     Pulmonary - normal exam   (+) shortness of breath,   Cardiovascular - normal exam        Neuro/Psych  (+) headaches,     GI/Hepatic/Renal/Endo    (+)  GERD,      Musculoskeletal     Abdominal    Substance History      OB/GYN          Other                        Anesthesia Plan    ASA 2     MAC     intravenous induction     Anesthetic plan, all risks, benefits, and alternatives have been provided, discussed and informed consent has been obtained with: patient.       Problem: Cardiovascular  Goal: Hemodynamic stability  Outcome: Ongoing     Problem: Pain Control  Goal: Maintain pain level at or below patient's acceptable level (or 5 if patient is unable to determine acceptable level)  Outcome: Ongoing

## 2021-08-04 LAB
LAB AP CASE REPORT: NORMAL
PATH REPORT.FINAL DX SPEC: NORMAL

## 2021-08-09 ENCOUNTER — OFFICE VISIT (OUTPATIENT)
Dept: GASTROENTEROLOGY | Facility: CLINIC | Age: 21
End: 2021-08-09

## 2021-08-09 VITALS
WEIGHT: 120.8 LBS | SYSTOLIC BLOOD PRESSURE: 124 MMHG | DIASTOLIC BLOOD PRESSURE: 79 MMHG | BODY MASS INDEX: 22.23 KG/M2 | HEIGHT: 62 IN | HEART RATE: 91 BPM

## 2021-08-09 DIAGNOSIS — K21.00 GASTROESOPHAGEAL REFLUX DISEASE WITH ESOPHAGITIS WITHOUT HEMORRHAGE: Primary | ICD-10-CM

## 2021-08-09 PROCEDURE — 99213 OFFICE O/P EST LOW 20 MIN: CPT | Performed by: NURSE PRACTITIONER

## 2021-08-09 NOTE — PATIENT INSTRUCTIONS
MyPlate from USDA    MyPlate is an outline of a general healthy diet based on the 2010 Dietary Guidelines for Americans, from the U.S. Department of Agriculture (USDA). It sets guidelines for how much food you should eat from each food group based on your age, sex, and level of physical activity.  What are tips for following MyPlate?  To follow MyPlate recommendations:  · Eat a wide variety of fruits and vegetables, grains, and protein foods.  · Serve smaller portions and eat less food throughout the day.  · Limit portion sizes to avoid overeating.  · Enjoy your food.  · Get at least 150 minutes of exercise every week. This is about 30 minutes each day, 5 or more days per week.  It can be difficult to have every meal look like MyPlate. Think about MyPlate as eating guidelines for an entire day, rather than each individual meal.  Fruits and vegetables  · Make half of your plate fruits and vegetables.  · Eat many different colors of fruits and vegetables each day.  · For a 2,000 calorie daily food plan, eat:  ? 2½ cups of vegetables every day.  ? 2 cups of fruit every day.  · 1 cup is equal to:  ? 1 cup raw or cooked vegetables.  ? 1 cup raw fruit.  ? 1 medium-sized orange, apple, or banana.  ? 1 cup 100% fruit or vegetable juice.  ? 2 cups raw leafy greens, such as lettuce, spinach, or kale.  ? ½ cup dried fruit.  Grains  · One fourth of your plate should be grains.  · Make at least half of the grains you eat each day whole grains.  · For a 2,000 calorie daily food plan, eat 6 oz of grains every day.  · 1 oz is equal to:  ? 1 slice bread.  ? 1 cup cereal.  ? ½ cup cooked rice, cereal, or pasta.  Protein  · One fourth of your plate should be protein.  · Eat a wide variety of protein foods, including meat, poultry, fish, eggs, beans, nuts, and tofu.  · For a 2,000 calorie daily food plan, eat 5½ oz of protein every day.  · 1 oz is equal to:  ? 1 oz meat, poultry, or fish.  ? ¼ cup cooked beans.  ? 1 egg.  ? ½ oz nuts  or seeds.  ? 1 Tbsp peanut butter.  Dairy  · Drink fat-free or low-fat (1%) milk.  · Eat or drink dairy as a side to meals.  · For a 2,000 calorie daily food plan, eat or drink 3 cups of dairy every day.  · 1 cup is equal to:  ? 1 cup milk, yogurt, cottage cheese, or soy milk (soy beverage).  ? 2 oz processed cheese.  ? 1½ oz natural cheese.  Fats, oils, salt, and sugars  · Only small amounts of oils are recommended.  · Avoid foods that are high in calories and low in nutritional value (empty calories), like foods high in fat or added sugars.  · Choose foods that are low in salt (sodium). Choose foods that have less than 140 milligrams (mg) of sodium per serving.  · Drink water instead of sugary drinks. Drink enough water each day to keep your urine pale yellow.  Where to find support  · Work with your health care provider or a nutrition specialist (dietitian) to develop a customized eating plan that is right for you.  · Download an roe (mobile application) to help you track your daily food intake.  Where to find more information  · Go to ChooseMyPlate.gov for more information.  Summary  · MyPlate is a general guideline for healthy eating from the USDA. It is based on the 2010 Dietary Guidelines for Americans.  · In general, fruits and vegetables should take up ½ of your plate, grains should take up ¼ of your plate, and protein should take up ¼ of your plate.  This information is not intended to replace advice given to you by your health care provider. Make sure you discuss any questions you have with your health care provider.  Document Revised: 05/21/2020 Document Reviewed: 03/19/2018  Elsevier Patient Education © 2021 Elsevier Inc.

## 2021-08-09 NOTE — PROGRESS NOTES
Chief Complaint   Patient presents with   • Heartburn       Subjective    Michela Nuñez is a 20 y.o. female. she is here today for follow-up.    History of Present Illness  20-year-old female presents for annual follow-up.  Reports abdominal pain has been much better with medication denies any recent episodes of nausea vomiting states occasional abdominal pain depending on her intake but overall has done very well generally avoids all known triggers.  EGD 7/30/2020  Noted grade 2 esophagitis gastritis and normal duodenum.  Antral biopsy noted reactive gastropathy.  Esophageal biopsy noted reactive gastropathy and negative for specialized Khanna's mucosa.       The following portions of the patient's history were reviewed and updated as appropriate:   Past Medical History:   Diagnosis Date   • Allergic    • Constipation    • GERD (gastroesophageal reflux disease)    • Headache    • Heart murmur     9 months   • Ingrown toenail    • Strep throat      Past Surgical History:   Procedure Laterality Date   • AVULSION TOENAIL PLATE     • ENDOSCOPY N/A 7/30/2021    Procedure: ESOPHAGOGASTRODUODENOSCOPY;  Surgeon: Bambi Carmona MD;  Location: [facility] ENDOSCOPY;  Service: Gastroenterology;  Laterality: N/A;     Family History   Adopted: Yes     OB History    No obstetric history on file.       Prior to Admission medications    Medication Sig Start Date End Date Taking? Authorizing Provider   cetirizine (zyrTEC) 10 MG tablet Take 10 mg by mouth Daily.   Yes Provider, MD Milvia   norethindrone-ethinyl estradiol (Loestrin 1/20, 21,) 1-20 MG-MCG per tablet Take 1 tablet by mouth Daily. 3/8/21 3/8/22 Yes Yodit Rick APRN   omeprazole (priLOSEC) 40 MG capsule Take 1 capsule by mouth Daily. 6/1/21  Yes Twyla Dueñas APRN     Allergies   Allergen Reactions   • Penicillins Hives     Social History     Socioeconomic History   • Marital status:      Spouse name: Not on file   • Number of  "children: Not on file   • Years of education: Not on file   • Highest education level: Not on file   Tobacco Use   • Smoking status: Never Smoker   • Smokeless tobacco: Never Used   Vaping Use   • Vaping Use: Never used   Substance and Sexual Activity   • Alcohol use: No   • Drug use: No   • Sexual activity: Never       Review of Systems  Review of Systems   Constitutional: Negative for activity change, appetite change, chills, diaphoresis, fatigue, fever and unexpected weight change.   HENT: Negative for sore throat and trouble swallowing.    Respiratory: Negative for shortness of breath.    Gastrointestinal: Negative for abdominal distention, abdominal pain, anal bleeding, blood in stool, constipation, diarrhea, nausea, rectal pain and vomiting.   Musculoskeletal: Negative for arthralgias.   Skin: Negative for pallor.   Neurological: Negative for light-headedness.        /79 (BP Location: Left arm)   Pulse 91   Ht 157.5 cm (62\")   Wt 54.8 kg (120 lb 12.8 oz)   BMI 22.09 kg/m²     Objective    Physical Exam  Constitutional:       General: She is not in acute distress.     Appearance: Normal appearance. She is well-developed.   Neck:      Thyroid: No thyroid mass or thyromegaly.   Pulmonary:      Effort: Pulmonary effort is normal.   Abdominal:      General: Bowel sounds are normal. There is no distension.      Palpations: Abdomen is soft.      Tenderness: There is no abdominal tenderness.      Hernia: No hernia is present.       Admission on 07/30/2021, Discharged on 07/30/2021   Component Date Value Ref Range Status   • HCG, Urine QL 07/30/2021 Negative  Negative Final   • Case Report 07/30/2021    Final                    Value:Surgical Pathology Report                         Case: [id number]                                  Authorizing Provider:  Bambi Carmona MD      Collected:           07/30/2021 01:43 PM          Ordering Location:     [facility]             Received:            " 08/02/2021 07:11 AM                                 [facility]                                                     Pathologist:           Mynor Guo MD                                                           Specimens:   1) - Gastric, Antrum, antrum bx                                                                     2) - Esophagus, Distal, distal esophagus bx                                               • Final Diagnosis 07/30/2021    Final                    Value:This result contains rich text formatting which cannot be displayed here.     Assessment/Plan      1. Gastroesophageal reflux disease with esophagitis without hemorrhage    .   Continue PPI daily avoid gastric irritants follow standard antireflux measures.  Follow-up in 3 months we will try discontinuing medication at that time.  Return to office sooner if needed    Orders placed during this encounter include:  No orders of the defined types were placed in this encounter.      * Surgery not found *    Review and/or summary of lab tests, radiology, procedures, medications. Review and summary of old records and obtaining of history. The risks and benefits of my recommendations, as well as other treatment options were discussed with the patient today. Questions were answered.    No orders of the defined types were placed in this encounter.      Follow-up: Return in about 3 months (around 11/9/2021) for Recheck.          This document has been electronically signed by KAREN Raya on August 9, 2021 14:18 CDT           I spent 10 minutes caring for Michela on this date of service. This time includes time spent by me in the following activities:preparing for the visit, reviewing tests, obtaining and/or reviewing a separately obtained history, performing a medically appropriate examination and/or evaluation , counseling and educating the patient/family/caregiver, ordering medications, tests, or procedures, referring and  communicating with other health care professionals , documenting information in the medical record and care coordination    Results for orders placed or performed during the hospital encounter of 07/30/21   Tissue Pathology Exam    Specimen: A: Gastric, Antrum; Tissue    B: Esophagus, Distal; Tissue   Result Value Ref Range    Case Report       Surgical Pathology Report                         Case: [id number]                                  Authorizing Provider:  Bambi Carmona MD      Collected:           07/30/2021 01:43 PM          Ordering Location:     [facility]             Received:            08/02/2021 07:11 AM                                 [facility]                                                     Pathologist:           Mynor Guo MD                                                           Specimens:   1) - Gastric, Antrum, antrum bx                                                                     2) - Esophagus, Distal, distal esophagus bx                                                Final Diagnosis       SEE SCANNED REPORT       Pregnancy, Urine - Urine, Clean Catch    Specimen: Urine, Clean Catch   Result Value Ref Range    HCG, Urine QL Negative Negative   Results for orders placed or performed in visit on 07/27/21   COVID-19, [facility] IN-HOUSE, NP SWAB IN TRANSPORT MEDIA 8-10 HR TAT - Swab, Nasopharynx    Specimen: Nasopharynx; Swab   Result Value Ref Range    COVID19 Not Detected Not Detected - Ref. Range   Results for orders placed or performed in visit on 03/08/21   Liquid-based Pap Smear, Screening    Specimen: Cervix; ThinPrep Vial   Result Value Ref Range    Case Report       Gynecologic Cytology Report                       Case: [id number]                                  Authorizing Provider:  Yodit Rick       Collected:           03/08/2021 09:41 AM                                 KAREN Gilmore                                                                  Ordering Location:     [facility]     Received:            03/08/2021 01:27 PM                                 [facility]                                                                 First Screen:          Deven Pritchard                                                             Specimen:    Sendout to [facility], Cervix                                                                     Interpretation See attached report    Results for orders placed or performed during the hospital encounter of 12/14/20   POCT Urine Micro    Specimen: Urine   Result Value Ref Range    WBC, UA 21-30 (A) None Seen /HPF    RBC, UA 6-12 (A) None Seen /HPF    Bacteria, UA 1+ (A) None Seen /HPF   POC Urinalysis Dipstick, Multipro (Automated dipstick)    Specimen: Urine   Result Value Ref Range    Color Yellow Yellow, Straw, Dark Yellow, Nevaeh    Clarity, UA Clear Clear    Glucose, UA Negative Negative, 1000 mg/dL (3+) mg/dL    Bilirubin Negative Negative    Ketones, UA Negative Negative    Specific Gravity  1.020 1.005 - 1.030    Blood, UA 1+ (A) Negative    pH, Urine 6.0 5.0 - 8.0    Protein, POC Negative Negative mg/dL    Urobilinogen, UA Normal Normal    Nitrite, UA Negative Negative    Leukocytes Negative Negative   Results for orders placed or performed during the hospital encounter of 03/03/20   POCT Influenza A/B    Specimen: Swab   Result Value Ref Range    Rapid Influenza A Ag Negative Negative    Rapid Influenza B Ag Negative Negative    Internal Control Passed Passed    Lot Number [id number]     Expiration Date 5/31/2021    POCT Infectious Mononucleosis    Specimen: Blood   Result Value Ref Range    Monospot Negative Negative    Internal Control Passed Passed    Lot Number [id number]     Expiration Date 7/31/2020    Results for orders placed or performed during the hospital encounter of 02/13/19   POCT Rapid Strep A    Specimen: Swab   Result Value Ref Range    Rapid Strep A Screen Negative Negative,  VALID, INVALID, Not Performed    Internal Control Passed Passed    Lot Number [id number]     Expiration Date 03/31/20    Results for orders placed or performed in visit on 06/28/18   Treadmill Stress Test   Result Value Ref Range    BH CV STRESS PROTOCOL 1 Jono     Stage 1 1     HR Stage 1 117     BP Stage 1 136/75     Duration Min Stage 1 3     Duration Sec Stage 1 0     Grade Stage 1 10     Speed Stage 1 1.7     BH CV STRESS METS STAGE 1 5     Stage 2 2     HR Stage 2 130     BP Stage 2 128/72     Duration Min Stage 2 3     Duration Sec Stage 2 0     Grade Stage 2 12     Speed Stage 2 2.5     BH CV STRESS METS STAGE 2 7.5     Stage 3 3     HR Stage 3 162     BP Stage 3 131/71     Duration Min Stage 3 3     Duration Sec Stage 3 0     Grade Stage 3 14     Speed Stage 3 3.4     BH CV STRESS METS STAGE 3 10.0     Stage 4 4     HR Stage 4 179     BP Stage 4 131/71     Duration Min Stage 4 3     Duration Sec Stage 4 0     Grade Stage 4 16     Speed Stage 4 4.2     BH CV STRESS METS STAGE 4 13.5     Baseline  bpm    Baseline /76 mmHg    Peak  bpm    Percent Max Pred HR 88.61 %    Percent Target  %    Peak /75 mmHg    Recovery  bpm    Recovery /77 mmHg    Target HR (85%) 172 bpm    Max. Pred. HR (100%) 202 bpm    Exercise duration (min) 10 min    Exercise duration (sec) 0 sec    Estimated workload 13.4 METS   Adult Transthoracic Echo Complete W/ Cont if Necessary Per Protocol   Result Value Ref Range    BSA 1.4 m^2    RVIDd 1.7 cm    IVSd 0.7 cm    LVIDd 3.6 cm    LVIDs 2.3 cm    LVPWd 0.6 cm    IVS/LVPW 1.2     FS 36.1 %    EDV(Teich) 54.4 ml    ESV(Teich) 18.1 ml    EF(Teich) 66.7 %    EDV(cubed) 46.7 ml    ESV(cubed) 12.2 ml    EF(cubed) 73.9 %    LV mass(C)d 59.7 grams    LV mass(C)dI 41.4 grams/m^2    SV(Teich) 36.3 ml    SI(Teich) 25.2 ml/m^2    SV(cubed) 34.5 ml    SI(cubed) 23.9 ml/m^2    Ao root diam 2.6 cm    Ao root area 5.3 cm^2    ACS 1.5 cm    LA dimension 2.1 cm     asc Aorta Diam 2.0 cm    Ao Isth Diam 1.5 cm    LA/Ao 0.81     LVOT diam 1.8 cm    LVOT area 2.5 cm^2    LVOT area(traced) 2.5 cm^2    LVLd ap4 7.3 cm    EDV(MOD-sp4) 63.0 ml    LVLs ap4 5.8 cm    ESV(MOD-sp4) 24.0 ml    EF(MOD-sp4) 61.9 %    LVLd ap2 8.4 cm    EDV(MOD-sp2) 70.0 ml    LVLs ap2 6.9 cm    ESV(MOD-sp2) 26.0 ml    EF(MOD-sp2) 62.9 %    SV(MOD-sp4) 39.0 ml    SI(MOD-sp4) 27.1 ml/m^2    SV(MOD-sp2) 44.0 ml    SI(MOD-sp2) 30.5 ml/m^2    Ao root area (BSA corrected) 1.8     EF - Contrast (2Ch) 62.9 ml/m^2    EF - Contrast (4Ch) 61.9 ml/m^2    LV Dewey Vol (BSA corrected) 43.7 ml/m^2    LV Sys Vol (BSA corrected) 16.6 ml/m^2    MV E max caroline 102.0 cm/sec    MV A max caroline 62.2 cm/sec    MV E/A 1.6     MV V2 max 95.2 cm/sec    MV max PG 3.6 mmHg    MV V2 mean 66.2 cm/sec    MV mean PG 2.0 mmHg    MV V2 VTI 17.8 cm    MVA(VTI) 2.4 cm^2    MV P1/2t max caroline 95.9 cm/sec    MV P1/2t 36.0 msec    MVA(P1/2t) 6.1 cm^2    MV dec slope 780.0 cm/sec^2    Ao pk caroline 130.0 cm/sec    Ao max PG 6.8 mmHg    Ao max PG (full) 3.3 mmHg    Ao V2 mean 88.9 cm/sec    Ao mean PG 4.0 mmHg    Ao mean PG (full) 2.0 mmHg    Ao V2 VTI 25.7 cm    TOMAS(I,A) 1.7 cm^2    TOMAS(I,D) 1.7 cm^2    TOMAS(V,A) 1.8 cm^2    TOMAS(V,D) 1.8 cm^2    LV V1 max PG 3.4 mmHg    LV V1 mean PG 2.0 mmHg    LV V1 max 92.8 cm/sec    LV V1 mean 64.1 cm/sec    LV V1 VTI 17.1 cm    SV(Ao) 136.4 ml    SI(Ao) 94.6 ml/m^2    SV(LVOT) 43.5 ml    SI(LVOT) 30.2 ml/m^2    PA V2 max 79.9 cm/sec    PA max PG 2.6 mmHg    PI end-d caroline 45.8 cm/sec    TR max caroline 184.0 cm/sec    MVA P1/2T LCG 2.3 cm^2     CV ECHO GEO - BZI_BMI 18.8 kilograms/m^2     CV ECHO GEO - BSA(HAYCOCK) 1.4 m^2     CV ECHO GEO - BZI_METRIC_WEIGHT 46.7 kg     CV ECHO GEO - BZI_METRIC_HEIGHT 157.5 cm    Target HR (85%) 172 bpm    Max. Pred. HR (100%) 202 bpm    Echo EF Estimated 56 %     *Note: Due to a large number of results and/or encounters for the requested time period, some results have not been  displayed. A complete set of results can be found in Results Review.

## 2021-08-20 ENCOUNTER — LAB (OUTPATIENT)
Dept: LAB | Facility: HOSPITAL | Age: 21
End: 2021-08-20

## 2021-08-20 ENCOUNTER — OFFICE VISIT (OUTPATIENT)
Dept: FAMILY MEDICINE CLINIC | Facility: CLINIC | Age: 21
End: 2021-08-20

## 2021-08-20 VITALS
SYSTOLIC BLOOD PRESSURE: 102 MMHG | WEIGHT: 120.5 LBS | DIASTOLIC BLOOD PRESSURE: 72 MMHG | HEIGHT: 62 IN | BODY MASS INDEX: 22.18 KG/M2

## 2021-08-20 DIAGNOSIS — Z13.29 SCREENING FOR HYPOTHYROIDISM: ICD-10-CM

## 2021-08-20 DIAGNOSIS — F33.1 MAJOR DEPRESSIVE DISORDER, RECURRENT EPISODE, MODERATE DEGREE (HCC): ICD-10-CM

## 2021-08-20 DIAGNOSIS — F33.1 MAJOR DEPRESSIVE DISORDER, RECURRENT EPISODE, MODERATE DEGREE (HCC): Primary | ICD-10-CM

## 2021-08-20 LAB
ALBUMIN SERPL-MCNC: 4.7 G/DL (ref 3.5–5.2)
ALBUMIN/GLOB SERPL: 1.4 G/DL
ALP SERPL-CCNC: 45 U/L (ref 39–117)
ALT SERPL W P-5'-P-CCNC: 16 U/L (ref 1–33)
ANION GAP SERPL CALCULATED.3IONS-SCNC: 10.4 MMOL/L (ref 5–15)
AST SERPL-CCNC: 21 U/L (ref 1–32)
BASOPHILS # BLD AUTO: 0.02 10*3/MM3 (ref 0–0.2)
BASOPHILS NFR BLD AUTO: 0.3 % (ref 0–1.5)
BILIRUB SERPL-MCNC: 0.5 MG/DL (ref 0–1.2)
BUN SERPL-MCNC: 13 MG/DL (ref 6–20)
BUN/CREAT SERPL: 18.3 (ref 7–25)
CALCIUM SPEC-SCNC: 9.7 MG/DL (ref 8.6–10.5)
CHLORIDE SERPL-SCNC: 101 MMOL/L (ref 98–107)
CO2 SERPL-SCNC: 23.6 MMOL/L (ref 22–29)
CREAT SERPL-MCNC: 0.71 MG/DL (ref 0.57–1)
DEPRECATED RDW RBC AUTO: 37.1 FL (ref 37–54)
EOSINOPHIL # BLD AUTO: 0.08 10*3/MM3 (ref 0–0.4)
EOSINOPHIL NFR BLD AUTO: 1.2 % (ref 0.3–6.2)
ERYTHROCYTE [DISTWIDTH] IN BLOOD BY AUTOMATED COUNT: 12.4 % (ref 12.3–15.4)
GFR SERPL CREATININE-BSD FRML MDRD: 104 ML/MIN/1.73
GLOBULIN UR ELPH-MCNC: 3.4 GM/DL
GLUCOSE SERPL-MCNC: 106 MG/DL (ref 65–99)
HCT VFR BLD AUTO: 42.1 % (ref 34–46.6)
HGB BLD-MCNC: 14.2 G/DL (ref 12–15.9)
IMM GRANULOCYTES # BLD AUTO: 0.01 10*3/MM3 (ref 0–0.05)
IMM GRANULOCYTES NFR BLD AUTO: 0.2 % (ref 0–0.5)
LYMPHOCYTES # BLD AUTO: 1.34 10*3/MM3 (ref 0.7–3.1)
LYMPHOCYTES NFR BLD AUTO: 20.7 % (ref 19.6–45.3)
MCH RBC QN AUTO: 28.3 PG (ref 26.6–33)
MCHC RBC AUTO-ENTMCNC: 33.7 G/DL (ref 31.5–35.7)
MCV RBC AUTO: 83.9 FL (ref 79–97)
MONOCYTES # BLD AUTO: 0.49 10*3/MM3 (ref 0.1–0.9)
MONOCYTES NFR BLD AUTO: 7.6 % (ref 5–12)
NEUTROPHILS NFR BLD AUTO: 4.53 10*3/MM3 (ref 1.7–7)
NEUTROPHILS NFR BLD AUTO: 70 % (ref 42.7–76)
NRBC BLD AUTO-RTO: 0 /100 WBC (ref 0–0.2)
PLATELET # BLD AUTO: 269 10*3/MM3 (ref 140–450)
PMV BLD AUTO: 10.5 FL (ref 6–12)
POTASSIUM SERPL-SCNC: 4.3 MMOL/L (ref 3.5–5.2)
PROT SERPL-MCNC: 8.1 G/DL (ref 6–8.5)
RBC # BLD AUTO: 5.02 10*6/MM3 (ref 3.77–5.28)
SODIUM SERPL-SCNC: 135 MMOL/L (ref 136–145)
TSH SERPL DL<=0.05 MIU/L-ACNC: 2.53 UIU/ML (ref 0.27–4.2)
WBC # BLD AUTO: 6.47 10*3/MM3 (ref 3.4–10.8)

## 2021-08-20 PROCEDURE — 85025 COMPLETE CBC W/AUTO DIFF WBC: CPT

## 2021-08-20 PROCEDURE — 80053 COMPREHEN METABOLIC PANEL: CPT

## 2021-08-20 PROCEDURE — 99213 OFFICE O/P EST LOW 20 MIN: CPT | Performed by: NURSE PRACTITIONER

## 2021-08-20 PROCEDURE — 80050 GENERAL HEALTH PANEL: CPT

## 2021-08-20 PROCEDURE — 84443 ASSAY THYROID STIM HORMONE: CPT

## 2021-08-20 RX ORDER — SERTRALINE HYDROCHLORIDE 25 MG/1
25 TABLET, FILM COATED ORAL DAILY
Qty: 30 TABLET | Refills: 1 | Status: SHIPPED | OUTPATIENT
Start: 2021-08-20 | End: 2021-09-28 | Stop reason: DRUGHIGH

## 2021-08-20 NOTE — PROGRESS NOTES
"Chief Complaint  Depression and Anxiety    Subjective   Within the last year has been having increasing anxiety and depression.  Got  this past October \"and I have been having a really hard time adjusting to the change.  I have also had a falling out with my best friend.\"          Michela Nuñez presents to [facility]  Depression  Visit Type: initial  Onset of symptoms: more than 1 year ago  Progression since onset: gradually worsening  Patient presents with the following symptoms: decreased concentration, depressed mood and irritability.  Patient is not experiencing: suicidal ideas and suicidal planning.  Frequency of symptoms: constantly   Severity: moderate   Aggravated by: family issues  Sleep quality: fair  Nighttime awakenings: one to two  No history of: anxiety/panic attacks  Treatment tried: nothing      Anxiety  Symptoms include decreased concentration, depressed mood and irritability. Patient reports no suicidal ideas.     Her past medical history is significant for depression. There is no history of anxiety/panic attacks.       Objective   Vital Signs:   /72   Ht 157.5 cm (62\")   Wt 54.7 kg (120 lb 8 oz)   BMI 22.04 kg/m²     Physical Exam  Vitals and nursing note reviewed.   Constitutional:       Appearance: Normal appearance. She is well-developed and normal weight.   Cardiovascular:      Rate and Rhythm: Normal rate and regular rhythm.      Heart sounds: Normal heart sounds.   Pulmonary:      Effort: Pulmonary effort is normal.      Breath sounds: Normal breath sounds.   Skin:     General: Skin is warm.   Neurological:      Mental Status: She is alert and oriented to person, place, and time.   Psychiatric:         Behavior: Behavior normal.        Result Review :                   Assessment and Plan    Diagnoses and all orders for this visit:    1. Major depressive disorder, recurrent episode, moderate degree (CMS/HCC) (Primary)  -     sertraline " (Zoloft) 25 MG tablet; Take 1 tablet by mouth Daily.  Dispense: 30 tablet; Refill: 1  -     CBC & Differential; Future  -     Comprehensive Metabolic Panel; Future    2. Screening for hypothyroidism  -     TSH; Future    1.  Major depressive disorder, recurrent episode, moderate degree:  Begin Zoloft as prescribed  Discussed at length with patient importance of giving the medication a minimum of 4 weeks before effects may be felt   Educated on possible side effects of this medication including not limited to increased risk of worsening of depression or thoughts of self-harm  Encouraged to seek emergency medical treatment for any new or worsening thoughts of hopelessness, helplessness or self-harm  Complete CBC chemistry panel as: Will notify of results when available    2.  Screening for hypothyroidism:  Complete TSH as ordered will notify of results when available      Follow Up   Return in about 5 weeks (around 9/24/2021).  Patient was given instructions and counseling regarding her condition or for health maintenance advice. Please see specific information pulled into the AVS if appropriate.         This document has been electronically signed by KAREN Thorne on August 20, 2021 12:47 CDT

## 2021-08-24 ENCOUNTER — TELEPHONE (OUTPATIENT)
Dept: FAMILY MEDICINE CLINIC | Facility: CLINIC | Age: 21
End: 2021-08-24

## 2021-08-24 NOTE — PROGRESS NOTES
Per KAREN Jacobson, Ms. Nuñez has been called with recent lab results & recommendations.  Continue current medications and follow-up as planned or sooner if any problems.

## 2021-08-24 NOTE — TELEPHONE ENCOUNTER
Per KAREN Jacobson, Ms. Nuñez has been called with recent lab results & recommendations.  Continue current medications and follow-up as planned or sooner if any problems.         ----- Message from KAREN Thorne sent at 8/22/2021  1:51 PM CDT -----  Glucose was slightly elevated at 106 but this was not a fasting lab so that is to be expected.  Sodium was just slightly low at 135.  Can add a pinch of salt to diet.  All other labs were essentially normal

## 2021-09-28 ENCOUNTER — OFFICE VISIT (OUTPATIENT)
Dept: FAMILY MEDICINE CLINIC | Facility: CLINIC | Age: 21
End: 2021-09-28

## 2021-09-28 VITALS
WEIGHT: 122 LBS | OXYGEN SATURATION: 97 % | HEART RATE: 81 BPM | BODY MASS INDEX: 22.45 KG/M2 | DIASTOLIC BLOOD PRESSURE: 72 MMHG | SYSTOLIC BLOOD PRESSURE: 104 MMHG | HEIGHT: 62 IN

## 2021-09-28 DIAGNOSIS — F33.0 MILD EPISODE OF RECURRENT MAJOR DEPRESSIVE DISORDER (HCC): Primary | ICD-10-CM

## 2021-09-28 PROCEDURE — 99213 OFFICE O/P EST LOW 20 MIN: CPT | Performed by: NURSE PRACTITIONER

## 2021-09-28 NOTE — PROGRESS NOTES
"Chief Complaint  Depression (1 month check up)    Subjective  One month follow-up for depression.  Was placed on Zoloft at previous visit.  \"I can tell that it had not really has been helping.  Even my  says he notices I think better.  I was wondering if maybe we could increase the dose just a little?\"        Michela Nuñez presents to [facility] - [place]  Depression  Visit Type: follow-up  Patient presents with the following symptoms: decreased concentration and depressed mood.  Patient is not experiencing: suicidal ideas and suicidal planning.  Frequency of symptoms: most days   Severity: mild   Sleep quality: fair  Nighttime awakenings: one to two        Objective   Vital Signs:   /72   Pulse 81   Ht 157.5 cm (62\")   Wt 55.3 kg (122 lb)   SpO2 97%   BMI 22.31 kg/m²     Physical Exam  Vitals and nursing note reviewed.   Constitutional:       Appearance: She is well-developed.   Cardiovascular:      Rate and Rhythm: Normal rate and regular rhythm.      Heart sounds: Normal heart sounds.   Pulmonary:      Effort: Pulmonary effort is normal.      Breath sounds: Normal breath sounds.   Musculoskeletal:         General: Normal range of motion.   Skin:     General: Skin is warm.   Neurological:      Mental Status: She is alert and oriented to person, place, and time.   Psychiatric:         Behavior: Behavior normal.        Result Review :     Common labs    Common Labsle 8/20/21 8/20/21    1110 1110   Glucose  106 (A)   BUN  13   Creatinine  0.71   eGFR Non African Am  104   Sodium  135 (A)   Potassium  4.3   Chloride  101   Calcium  9.7   Albumin  4.70   Total Bilirubin  0.5   Alkaline Phosphatase  45   AST (SGOT)  21   ALT (SGPT)  16   WBC 6.47    Hemoglobin 14.2    Hematocrit 42.1    Platelets 269    (A) Abnormal value       Comments are available for some flowsheets but are not being displayed.                     Assessment and Plan  "   Diagnoses and all orders for this visit:    1. Mild episode of recurrent major depressive disorder (HCC) (Primary)  -     sertraline (Zoloft) 50 MG tablet; Take 1 tablet by mouth Daily.  Dispense: 90 tablet; Refill: 1      1.  Mild episode of recurrent major depressive disorder:  Increase Zoloft from 25 mg p.o. daily to 50 mg p.o. daily  Continue with stress reducing techniques  Seek emergency medical treatment for any new or worsening thoughts of hopelessness, helplessness or self-harm      Follow Up   Return in about 2 months (around 12/13/2021) for Annual physical.  Patient was given instructions and counseling regarding her condition or for health maintenance advice. Please see specific information pulled into the AVS if appropriate.         This document has been electronically signed by KAREN Thorne on September 28, 2021 08:19 CDT

## 2021-10-18 DIAGNOSIS — K21.9 GASTROESOPHAGEAL REFLUX DISEASE, UNSPECIFIED WHETHER ESOPHAGITIS PRESENT: ICD-10-CM

## 2021-10-18 RX ORDER — OMEPRAZOLE 40 MG/1
CAPSULE, DELAYED RELEASE ORAL
Qty: 30 CAPSULE | Refills: 2 | Status: SHIPPED | OUTPATIENT
Start: 2021-10-18 | End: 2021-11-09

## 2021-11-09 ENCOUNTER — OFFICE VISIT (OUTPATIENT)
Dept: GASTROENTEROLOGY | Facility: CLINIC | Age: 21
End: 2021-11-09

## 2021-11-09 VITALS
HEART RATE: 101 BPM | DIASTOLIC BLOOD PRESSURE: 74 MMHG | WEIGHT: 121.2 LBS | HEIGHT: 62 IN | BODY MASS INDEX: 22.31 KG/M2 | SYSTOLIC BLOOD PRESSURE: 120 MMHG

## 2021-11-09 DIAGNOSIS — K21.00 GASTROESOPHAGEAL REFLUX DISEASE WITH ESOPHAGITIS WITHOUT HEMORRHAGE: Primary | ICD-10-CM

## 2021-11-09 PROCEDURE — 99213 OFFICE O/P EST LOW 20 MIN: CPT | Performed by: NURSE PRACTITIONER

## 2021-11-09 NOTE — PROGRESS NOTES
Chief Complaint   Patient presents with   • Heartburn       Subjective    Michela Kovacs is a 21 y.o. female. she is here today for follow-up.    History of Present Illness  21-year-old female presents for follow-up regarding reflux and epigastric pain.  Reports she has done much better over the last few weeks has stopped her medication and denies any breakthrough symptoms.  She denies any recent episodes of nausea or vomiting.  Previous EGD noted grade 2 esophagitis and gastritis.  Biopsy was negative for Khanna's mucosa.       The following portions of the patient's history were reviewed and updated as appropriate:   Past Medical History:   Diagnosis Date   • Allergic    • Constipation    • GERD (gastroesophageal reflux disease)    • Headache    • Heart murmur     9 months   • Ingrown toenail    • Strep throat      Past Surgical History:   Procedure Laterality Date   • AVULSION TOENAIL PLATE     • ENDOSCOPY N/A 7/30/2021    Procedure: ESOPHAGOGASTRODUODENOSCOPY;  Surgeon: Bambi Carmona MD;  Location: [facility] ENDOSCOPY;  Service: Gastroenterology;  Laterality: N/A;     Family History   Adopted: Yes     OB History    No obstetric history on file.       Prior to Admission medications    Medication Sig Start Date End Date Taking? Authorizing Provider   cetirizine (zyrTEC) 10 MG tablet Take 10 mg by mouth Daily.   Yes Provider, MD Milvia   norethindrone-ethinyl estradiol (Loestrin 1/20, 21,) 1-20 MG-MCG per tablet Take 1 tablet by mouth Daily. 3/8/21 3/8/22 Yes Yodit Rick APRN   omeprazole (priLOSEC) 40 MG capsule TAKE ONE CAPSULE BY MOUTH DAILY 10/18/21  Yes Indira Paul APRN   sertraline (Zoloft) 50 MG tablet Take 1 tablet by mouth Daily. 9/28/21  Yes Indira Paul APRN     Allergies   Allergen Reactions   • Penicillins Hives     Social History     Socioeconomic History   • Marital status:    Tobacco Use   • Smoking status: Never Smoker   • Smokeless tobacco: Never Used  "  Vaping Use   • Vaping Use: Never used   Substance and Sexual Activity   • Alcohol use: No   • Drug use: No   • Sexual activity: Never       Review of Systems  Review of Systems   Constitutional: Positive for fatigue. Negative for activity change, appetite change, chills, diaphoresis, fever and unexpected weight change.   HENT: Negative for sore throat and trouble swallowing.    Respiratory: Negative for shortness of breath.    Gastrointestinal: Negative for abdominal distention, abdominal pain, anal bleeding, blood in stool, constipation, diarrhea, nausea, rectal pain and vomiting.   Musculoskeletal: Negative for arthralgias.   Skin: Negative for pallor.   Neurological: Negative for light-headedness.        /74 (BP Location: Left arm)   Pulse 101   Ht 157.5 cm (62\")   Wt 55 kg (121 lb 3.2 oz)   BMI 22.17 kg/m²     Objective    Physical Exam  Constitutional:       General: She is not in acute distress.     Appearance: Normal appearance. She is normal weight. She is not ill-appearing.   HENT:      Head: Normocephalic and atraumatic.   Pulmonary:      Effort: Pulmonary effort is normal.   Abdominal:      General: Bowel sounds are normal. There is no distension.      Palpations: Abdomen is soft. There is no mass.      Tenderness: There is no abdominal tenderness.   Neurological:      Mental Status: She is alert.       Lab on 08/20/2021   Component Date Value Ref Range Status   • Glucose 08/20/2021 106* 65 - 99 mg/dL Final   • BUN 08/20/2021 13  6 - 20 mg/dL Final   • Creatinine 08/20/2021 0.71  0.57 - 1.00 mg/dL Final   • Sodium 08/20/2021 135* 136 - 145 mmol/L Final   • Potassium 08/20/2021 4.3  3.5 - 5.2 mmol/L Final    Slight hemolysis detected by analyzer. Results may be affected.   • Chloride 08/20/2021 101  98 - 107 mmol/L Final   • CO2 08/20/2021 23.6  22.0 - 29.0 mmol/L Final   • Calcium 08/20/2021 9.7  8.6 - 10.5 mg/dL Final   • Total Protein 08/20/2021 8.1  6.0 - 8.5 g/dL Final   • Albumin " 08/20/2021 4.70  3.50 - 5.20 g/dL Final   • ALT (SGPT) 08/20/2021 16  1 - 33 U/L Final   • AST (SGOT) 08/20/2021 21  1 - 32 U/L Final   • Alkaline Phosphatase 08/20/2021 45  39 - 117 U/L Final   • Total Bilirubin 08/20/2021 0.5  0.0 - 1.2 mg/dL Final   • eGFR Non  Amer 08/20/2021 104  >60 mL/min/1.73 Final   • Globulin 08/20/2021 3.4  gm/dL Final   • A/G Ratio 08/20/2021 1.4  g/dL Final   • BUN/Creatinine Ratio 08/20/2021 18.3  7.0 - 25.0 Final   • Anion Gap 08/20/2021 10.4  5.0 - 15.0 mmol/L Final   • TSH 08/20/2021 2.530  0.270 - 4.200 uIU/mL Final   • WBC 08/20/2021 6.47  3.40 - 10.80 10*3/mm3 Final   • RBC 08/20/2021 5.02  3.77 - 5.28 10*6/mm3 Final   • Hemoglobin 08/20/2021 14.2  12.0 - 15.9 g/dL Final   • Hematocrit 08/20/2021 42.1  34.0 - 46.6 % Final   • MCV 08/20/2021 83.9  79.0 - 97.0 fL Final   • MCH 08/20/2021 28.3  26.6 - 33.0 pg Final   • MCHC 08/20/2021 33.7  31.5 - 35.7 g/dL Final   • RDW 08/20/2021 12.4  12.3 - 15.4 % Final   • RDW-SD 08/20/2021 37.1  37.0 - 54.0 fl Final   • MPV 08/20/2021 10.5  6.0 - 12.0 fL Final   • Platelets 08/20/2021 269  140 - 450 10*3/mm3 Final   • Neutrophil % 08/20/2021 70.0  42.7 - 76.0 % Final   • Lymphocyte % 08/20/2021 20.7  19.6 - 45.3 % Final   • Monocyte % 08/20/2021 7.6  5.0 - 12.0 % Final   • Eosinophil % 08/20/2021 1.2  0.3 - 6.2 % Final   • Basophil % 08/20/2021 0.3  0.0 - 1.5 % Final   • Immature Grans % 08/20/2021 0.2  0.0 - 0.5 % Final   • Neutrophils, Absolute 08/20/2021 4.53  1.70 - 7.00 10*3/mm3 Final   • Lymphocytes, Absolute 08/20/2021 1.34  0.70 - 3.10 10*3/mm3 Final   • Monocytes, Absolute 08/20/2021 0.49  0.10 - 0.90 10*3/mm3 Final   • Eosinophils, Absolute 08/20/2021 0.08  0.00 - 0.40 10*3/mm3 Final   • Basophils, Absolute 08/20/2021 0.02  0.00 - 0.20 10*3/mm3 Final   • Immature Grans, Absolute 08/20/2021 0.01  0.00 - 0.05 10*3/mm3 Final   • nRBC 08/20/2021 0.0  0.0 - 0.2 /100 WBC Final     Assessment/Plan      1. Gastroesophageal reflux  disease with esophagitis without hemorrhage    .   Discontinue PPI since symptoms have resolved follow-up in GI office as needed continue to follow dietary modifications and avoid gastric irritants.    Orders placed during this encounter include:  No orders of the defined types were placed in this encounter.      * Surgery not found *    Review and/or summary of lab tests, radiology, procedures, medications. Review and summary of old records and obtaining of history. The risks and benefits of my recommendations, as well as other treatment options were discussed with the patient today. Questions were answered.    No orders of the defined types were placed in this encounter.      Follow-up: Return if symptoms worsen or fail to improve, for Recheck.          This document has been electronically signed by KAREN Raya on November 9, 2021 13:55 CST           I spent 15 minutes caring for Michela on this date of service. This time includes time spent by me in the following activities:preparing for the visit, reviewing tests, obtaining and/or reviewing a separately obtained history, performing a medically appropriate examination and/or evaluation , counseling and educating the patient/family/caregiver, ordering medications, tests, or procedures, referring and communicating with other health care professionals , documenting information in the medical record and care coordination    Results for orders placed or performed in visit on 08/20/21   CBC Auto Differential    Specimen: Blood   Result Value Ref Range    WBC 6.47 3.40 - 10.80 10*3/mm3    RBC 5.02 3.77 - 5.28 10*6/mm3    Hemoglobin 14.2 12.0 - 15.9 g/dL    Hematocrit 42.1 34.0 - 46.6 %    MCV 83.9 79.0 - 97.0 fL    MCH 28.3 26.6 - 33.0 pg    MCHC 33.7 31.5 - 35.7 g/dL    RDW 12.4 12.3 - 15.4 %    RDW-SD 37.1 37.0 - 54.0 fl    MPV 10.5 6.0 - 12.0 fL    Platelets 269 140 - 450 10*3/mm3    Neutrophil % 70.0 42.7 - 76.0 %    Lymphocyte % 20.7 19.6 - 45.3 %     Monocyte % 7.6 5.0 - 12.0 %    Eosinophil % 1.2 0.3 - 6.2 %    Basophil % 0.3 0.0 - 1.5 %    Immature Grans % 0.2 0.0 - 0.5 %    Neutrophils, Absolute 4.53 1.70 - 7.00 10*3/mm3    Lymphocytes, Absolute 1.34 0.70 - 3.10 10*3/mm3    Monocytes, Absolute 0.49 0.10 - 0.90 10*3/mm3    Eosinophils, Absolute 0.08 0.00 - 0.40 10*3/mm3    Basophils, Absolute 0.02 0.00 - 0.20 10*3/mm3    Immature Grans, Absolute 0.01 0.00 - 0.05 10*3/mm3    nRBC 0.0 0.0 - 0.2 /100 WBC   TSH    Specimen: Blood   Result Value Ref Range    TSH 2.530 0.270 - 4.200 uIU/mL   Comprehensive Metabolic Panel    Specimen: Blood   Result Value Ref Range    Glucose 106 (H) 65 - 99 mg/dL    BUN 13 6 - 20 mg/dL    Creatinine 0.71 0.57 - 1.00 mg/dL    Sodium 135 (L) 136 - 145 mmol/L    Potassium 4.3 3.5 - 5.2 mmol/L    Chloride 101 98 - 107 mmol/L    CO2 23.6 22.0 - 29.0 mmol/L    Calcium 9.7 8.6 - 10.5 mg/dL    Total Protein 8.1 6.0 - 8.5 g/dL    Albumin 4.70 3.50 - 5.20 g/dL    ALT (SGPT) 16 1 - 33 U/L    AST (SGOT) 21 1 - 32 U/L    Alkaline Phosphatase 45 39 - 117 U/L    Total Bilirubin 0.5 0.0 - 1.2 mg/dL    eGFR Non African Amer 104 >60 mL/min/1.73    Globulin 3.4 gm/dL    A/G Ratio 1.4 g/dL    BUN/Creatinine Ratio 18.3 7.0 - 25.0    Anion Gap 10.4 5.0 - 15.0 mmol/L   Results for orders placed or performed during the hospital encounter of 07/30/21   Tissue Pathology Exam    Specimen: A: Gastric, Antrum; Tissue    B: Esophagus, Distal; Tissue   Result Value Ref Range    Case Report       Surgical Pathology Report                         Case: [id number]                                  Authorizing Provider:  Bambi Carmona MD      Collected:           07/30/2021 01:43 PM          Ordering Location:     [facility]             Received:            08/02/2021 07:11 AM                                 [facility]                                                     Pathologist:           Mynor Guo MD                                                            Specimens:   1) - Gastric, Antrum, antrum bx                                                                     2) - Esophagus, Distal, distal esophagus bx                                                Final Diagnosis       SEE SCANNED REPORT       Pregnancy, Urine - Urine, Clean Catch    Specimen: Urine, Clean Catch   Result Value Ref Range    HCG, Urine QL Negative Negative   Results for orders placed or performed in visit on 07/27/21   COVID-19, [facility] IN-HOUSE, NP SWAB IN TRANSPORT MEDIA 8-10 HR TAT - Swab, Nasopharynx    Specimen: Nasopharynx; Swab   Result Value Ref Range    COVID19 Not Detected Not Detected - Ref. Range   Results for orders placed or performed in visit on 03/08/21   Liquid-based Pap Smear, Screening    Specimen: Cervix; ThinPrep Vial   Result Value Ref Range    Case Report       Gynecologic Cytology Report                       Case: [id number]                                  Authorizing Provider:  Yodit Rick       Collected:           03/08/2021 09:41 AM                                 KAREN Gilmore                                                                 Ordering Location:     [facility]     Received:            03/08/2021 01:27 PM                                 [facility]                                                                 First Screen:          Deven Pritchard                                                             Specimen:    Sendout to [facility], Cervix                                                                     Interpretation See attached report    Results for orders placed or performed during the hospital encounter of 12/14/20   POCT Urine Micro    Specimen: Urine   Result Value Ref Range    WBC, UA 21-30 (A) None Seen /HPF    RBC, UA 6-12 (A) None Seen /HPF    Bacteria, UA 1+ (A) None Seen /HPF   POC Urinalysis Dipstick, Multipro (Automated dipstick)    Specimen: Urine   Result Value Ref Range    Color  Yellow Yellow, Straw, Dark Yellow, Nevaeh    Clarity, UA Clear Clear    Glucose, UA Negative Negative, 1000 mg/dL (3+) mg/dL    Bilirubin Negative Negative    Ketones, UA Negative Negative    Specific Gravity  1.020 1.005 - 1.030    Blood, UA 1+ (A) Negative    pH, Urine 6.0 5.0 - 8.0    Protein, POC Negative Negative mg/dL    Urobilinogen, UA Normal Normal    Nitrite, UA Negative Negative    Leukocytes Negative Negative   Results for orders placed or performed during the hospital encounter of 03/03/20   POCT Influenza A/B    Specimen: Swab   Result Value Ref Range    Rapid Influenza A Ag Negative Negative    Rapid Influenza B Ag Negative Negative    Internal Control Passed Passed    Lot Number [id number]     Expiration Date 5/31/2021    POCT Infectious Mononucleosis    Specimen: Blood   Result Value Ref Range    Monospot Negative Negative    Internal Control Passed Passed    Lot Number [id number]     Expiration Date 7/31/2020    Results for orders placed or performed during the hospital encounter of 02/13/19   POCT Rapid Strep A    Specimen: Swab   Result Value Ref Range    Rapid Strep A Screen Negative Negative, VALID, INVALID, Not Performed    Internal Control Passed Passed    Lot Number [id number]     Expiration Date 03/31/20    Results for orders placed or performed in visit on 06/28/18   Treadmill Stress Test   Result Value Ref Range    BH CV STRESS PROTOCOL 1 Jono     Stage 1 1     HR Stage 1 117     BP Stage 1 136/75     Duration Min Stage 1 3     Duration Sec Stage 1 0     Grade Stage 1 10     Speed Stage 1 1.7     BH CV STRESS METS STAGE 1 5     Stage 2 2     HR Stage 2 130     BP Stage 2 128/72     Duration Min Stage 2 3     Duration Sec Stage 2 0     Grade Stage 2 12     Speed Stage 2 2.5     BH CV STRESS METS STAGE 2 7.5     Stage 3 3     HR Stage 3 162     BP Stage 3 131/71     Duration Min Stage 3 3     Duration Sec Stage 3 0     Grade Stage 3 14     Speed Stage 3 3.4     BH CV STRESS METS STAGE 3 10.0      Stage 4 4     HR Stage 4 179     BP Stage 4 131/71     Duration Min Stage 4 3     Duration Sec Stage 4 0     Grade Stage 4 16     Speed Stage 4 4.2     BH CV STRESS METS STAGE 4 13.5     Baseline  bpm    Baseline /76 mmHg    Peak  bpm    Percent Max Pred HR 88.61 %    Percent Target  %    Peak /75 mmHg    Recovery  bpm    Recovery /77 mmHg    Target HR (85%) 172 bpm    Max. Pred. HR (100%) 202 bpm    Exercise duration (min) 10 min    Exercise duration (sec) 0 sec    Estimated workload 13.4 METS     *Note: Due to a large number of results and/or encounters for the requested time period, some results have not been displayed. A complete set of results can be found in Results Review.

## 2022-01-11 ENCOUNTER — OFFICE VISIT (OUTPATIENT)
Dept: FAMILY MEDICINE CLINIC | Facility: CLINIC | Age: 22
End: 2022-01-11

## 2022-01-11 VITALS
HEIGHT: 62 IN | HEART RATE: 85 BPM | DIASTOLIC BLOOD PRESSURE: 64 MMHG | WEIGHT: 129.2 LBS | OXYGEN SATURATION: 99 % | SYSTOLIC BLOOD PRESSURE: 98 MMHG | BODY MASS INDEX: 23.77 KG/M2

## 2022-01-11 DIAGNOSIS — F33.0 MILD EPISODE OF RECURRENT MAJOR DEPRESSIVE DISORDER: ICD-10-CM

## 2022-01-11 DIAGNOSIS — Z13.29 SCREENING FOR HYPOTHYROIDISM: ICD-10-CM

## 2022-01-11 DIAGNOSIS — Z00.00 ANNUAL PHYSICAL EXAM: Primary | ICD-10-CM

## 2022-01-11 DIAGNOSIS — Z13.220 SCREENING FOR HYPERCHOLESTEROLEMIA: ICD-10-CM

## 2022-01-11 DIAGNOSIS — N92.6 IRREGULAR MENSES: ICD-10-CM

## 2022-01-11 DIAGNOSIS — Z11.59 NEED FOR HEPATITIS C SCREENING TEST: ICD-10-CM

## 2022-01-11 PROCEDURE — 99395 PREV VISIT EST AGE 18-39: CPT | Performed by: NURSE PRACTITIONER

## 2022-01-11 RX ORDER — NORGESTIMATE AND ETHINYL ESTRADIOL 0.25-0.035
1 KIT ORAL DAILY
Qty: 28 TABLET | Refills: 12 | Status: SHIPPED | OUTPATIENT
Start: 2022-01-11 | End: 2022-05-05 | Stop reason: SDUPTHER

## 2022-01-11 NOTE — PROGRESS NOTES
"Chief Complaint  Annual Exam    Subjective  Annual physical exam.  Has depression which is currently controlled with Zoloft.  Has been on Loestrin contraception for several years.  Over the last several months has been noticing increasing episodes of hot flashes and delay in menstruation.  \"Sometimes when I start back on my active pill it takes three or four days before I will actually start my period.\"          Michela Kovacs presents to [facility] [place]  Depression  Visit Type: follow-up  Patient presents with the following symptoms: decreased concentration and depressed mood.  Patient is not experiencing: suicidal ideas and suicidal planning.  Frequency of symptoms: occasionally   Severity: mild   Sleep quality: fair  Nighttime awakenings: one to two    Menstrual Problem  This is a new problem. The current episode started more than 1 month ago. The problem has been waxing and waning. Associated symptoms comments: Hot flashes and delayed menstrual start. Nothing aggravates the symptoms. Treatments tried: Loestrin.       Objective   Vital Signs:   BP 98/64   Pulse 85   Ht 157.5 cm (62\")   Wt 58.6 kg (129 lb 3.2 oz)   SpO2 99%   BMI 23.63 kg/m²     Physical Exam  Vitals and nursing note reviewed.   Constitutional:       Appearance: Normal appearance. She is well-developed and normal weight.   HENT:      Head: Normocephalic.      Right Ear: Tympanic membrane, ear canal and external ear normal.      Left Ear: Tympanic membrane, ear canal and external ear normal.      Mouth/Throat:      Mouth: Mucous membranes are dry.      Pharynx: Oropharynx is clear.   Eyes:      Extraocular Movements: Extraocular movements intact.      Conjunctiva/sclera: Conjunctivae normal.      Pupils: Pupils are equal, round, and reactive to light.   Cardiovascular:      Rate and Rhythm: Normal rate and regular rhythm.      Pulses: Normal pulses.      Heart sounds: Normal heart " sounds.   Pulmonary:      Effort: Pulmonary effort is normal.      Breath sounds: Normal breath sounds.   Chest:      Comments: Bilateral, manual breast exam performed and no dimpling, puckering, masses or nodules palpated    Abdominal:      General: Bowel sounds are normal.      Palpations: Abdomen is soft.   Musculoskeletal:         General: Normal range of motion.      Cervical back: Normal range of motion and neck supple.   Skin:     General: Skin is warm.      Capillary Refill: Capillary refill takes less than 2 seconds.   Neurological:      Mental Status: She is alert and oriented to person, place, and time.   Psychiatric:         Behavior: Behavior normal.        Result Review :     Common labs    Common Labsle 8/20/21 8/20/21    1110 1110   Glucose  106 (A)   BUN  13   Creatinine  0.71   eGFR Non African Am  104   Sodium  135 (A)   Potassium  4.3   Chloride  101   Calcium  9.7   Albumin  4.70   Total Bilirubin  0.5   Alkaline Phosphatase  45   AST (SGOT)  21   ALT (SGPT)  16   WBC 6.47    Hemoglobin 14.2    Hematocrit 42.1    Platelets 269    (A) Abnormal value       Comments are available for some flowsheets but are not being displayed.                     Assessment and Plan    Diagnoses and all orders for this visit:    1. Annual physical exam (Primary)    2. Mild episode of recurrent major depressive disorder (HCC)  -     CBC & Differential; Future  -     Comprehensive Metabolic Panel; Future    3. Irregular menses  -     hCG, Serum, Qualitative; Future  -     norgestimate-ethinyl estradiol (Ortho-Cyclen, 28,) 0.25-35 MG-MCG per tablet; Take 1 tablet by mouth Daily.  Dispense: 28 tablet; Refill: 12    4. Screening for hypothyroidism  -     TSH; Future    5. Screening for hypercholesterolemia  -     Lipid panel; Future    6. Need for hepatitis C screening test  -     Hepatitis C antibody; Future    1.  Annual physical exam:  Continue on current medications as previously prescribed   Counseling on  importance of heathy eating habits and regular physical activity regimen on improving overall physical and mental health.     2.  Mild episode of recurrent major depressive disorder:  Complete CBC chemistry panel as ordered and will notify of results when available  Continue on Zoloft as previously prescribed  Continue stress reducing techniques as previously discussed  Seek emergency medical treatment for any new or worsening thoughts of hopelessness, helplessness or self-harm    3.  Irregular menses:  Complete serum hCG as ordered and will notify of results when available  Discontinue Loestrin as previously prescribed  Began Ortho-Cyclen as prescribed  Educated on common side effects of this medication including not limited to increased risk for irregular menstrual cycles  Will contact this office in 3 months to give symptom update after beginning new oral contraceptive    4.  Screening for hypothyroidism,  Complete TSH as ordered and will notify of results when available    5.  Screening for hypercholesterolemia:  Complete fasting lipid panel as ordered and will notify of results when available    6.  Need for hepatitis C screening test:  Complete hepatitis C antibody as ordered and notify results when available    I spent 26 minutes caring for Michela on this date of service. This time includes time spent by me in the following activities:preparing for the visit, reviewing tests, obtaining and/or reviewing a separately obtained history, performing a medically appropriate examination and/or evaluation , counseling and educating the patient/family/caregiver, ordering medications, tests, or procedures and documenting information in the medical record  Follow Up   Return in about 1 year (around 1/11/2023) for Annual physical.  Patient was given instructions and counseling regarding her condition or for health maintenance advice. Please see specific information pulled into the AVS if appropriate.         This  document has been electronically signed by KAREN Thorne on January 11, 2022 12:27 CST

## 2022-01-25 PROCEDURE — U0003 INFECTIOUS AGENT DETECTION BY NUCLEIC ACID (DNA OR RNA); SEVERE ACUTE RESPIRATORY SYNDROME CORONAVIRUS 2 (SARS-COV-2) (CORONAVIRUS DISEASE [COVID-19]), AMPLIFIED PROBE TECHNIQUE, MAKING USE OF HIGH THROUGHPUT TECHNOLOGIES AS DESCRIBED BY CMS-2020-01-R: HCPCS | Performed by: NURSE PRACTITIONER

## 2022-04-14 DIAGNOSIS — F33.0 MILD EPISODE OF RECURRENT MAJOR DEPRESSIVE DISORDER: ICD-10-CM

## 2022-04-21 DIAGNOSIS — F33.0 MILD EPISODE OF RECURRENT MAJOR DEPRESSIVE DISORDER: ICD-10-CM

## 2022-05-05 DIAGNOSIS — N92.6 IRREGULAR MENSES: ICD-10-CM

## 2022-05-05 RX ORDER — NORGESTIMATE AND ETHINYL ESTRADIOL 0.25-0.035
1 KIT ORAL DAILY
Qty: 28 TABLET | Refills: 12 | Status: SHIPPED | OUTPATIENT
Start: 2022-05-05 | End: 2022-09-29 | Stop reason: SINTOL

## 2022-09-22 ENCOUNTER — HOSPITAL ENCOUNTER (EMERGENCY)
Facility: HOSPITAL | Age: 22
Discharge: HOME | End: 2022-09-22
Attending: EMERGENCY MEDICINE | Admitting: EMERGENCY MEDICINE

## 2022-09-22 ENCOUNTER — APPOINTMENT (OUTPATIENT)
Dept: CT IMAGING | Facility: HOSPITAL | Age: 22
End: 2022-09-22

## 2022-09-22 VITALS
DIASTOLIC BLOOD PRESSURE: 79 MMHG | WEIGHT: 135 LBS | HEIGHT: 62 IN | TEMPERATURE: 97.6 F | BODY MASS INDEX: 24.84 KG/M2 | OXYGEN SATURATION: 98 % | HEART RATE: 97 BPM | SYSTOLIC BLOOD PRESSURE: 123 MMHG | RESPIRATION RATE: 18 BRPM

## 2022-09-22 DIAGNOSIS — R51.9 ACUTE NONINTRACTABLE HEADACHE, UNSPECIFIED HEADACHE TYPE: Primary | ICD-10-CM

## 2022-09-22 LAB
ALBUMIN SERPL-MCNC: 4.7 G/DL (ref 3.5–5.2)
ALBUMIN/GLOB SERPL: 1.4 G/DL
ALP SERPL-CCNC: 33 U/L (ref 39–117)
ALT SERPL W P-5'-P-CCNC: 28 U/L (ref 1–33)
ANION GAP SERPL CALCULATED.3IONS-SCNC: 17 MMOL/L (ref 5–15)
AST SERPL-CCNC: 26 U/L (ref 1–32)
BASOPHILS # BLD AUTO: 0.02 10*3/MM3 (ref 0–0.2)
BASOPHILS NFR BLD AUTO: 0.2 % (ref 0–1.5)
BILIRUB SERPL-MCNC: 0.3 MG/DL (ref 0–1.2)
BUN SERPL-MCNC: 14 MG/DL (ref 6–20)
BUN/CREAT SERPL: 19.2 (ref 7–25)
CALCIUM SPEC-SCNC: 9.5 MG/DL (ref 8.6–10.5)
CHLORIDE SERPL-SCNC: 100 MMOL/L (ref 98–107)
CO2 SERPL-SCNC: 20 MMOL/L (ref 22–29)
CREAT SERPL-MCNC: 0.73 MG/DL (ref 0.57–1)
DEPRECATED RDW RBC AUTO: 35.2 FL (ref 37–54)
EGFRCR SERPLBLD CKD-EPI 2021: 119.4 ML/MIN/1.73
EOSINOPHIL # BLD AUTO: 0.03 10*3/MM3 (ref 0–0.4)
EOSINOPHIL NFR BLD AUTO: 0.3 % (ref 0.3–6.2)
ERYTHROCYTE [DISTWIDTH] IN BLOOD BY AUTOMATED COUNT: 12 % (ref 12.3–15.4)
GLOBULIN UR ELPH-MCNC: 3.3 GM/DL
GLUCOSE SERPL-MCNC: 148 MG/DL (ref 65–99)
HCG SERPL QL: NEGATIVE
HCT VFR BLD AUTO: 38.5 % (ref 34–46.6)
HGB BLD-MCNC: 13.3 G/DL (ref 12–15.9)
IMM GRANULOCYTES # BLD AUTO: 0.04 10*3/MM3 (ref 0–0.05)
IMM GRANULOCYTES NFR BLD AUTO: 0.4 % (ref 0–0.5)
LYMPHOCYTES # BLD AUTO: 1.23 10*3/MM3 (ref 0.7–3.1)
LYMPHOCYTES NFR BLD AUTO: 11.1 % (ref 19.6–45.3)
MCH RBC QN AUTO: 28 PG (ref 26.6–33)
MCHC RBC AUTO-ENTMCNC: 34.5 G/DL (ref 31.5–35.7)
MCV RBC AUTO: 81.1 FL (ref 79–97)
MONOCYTES # BLD AUTO: 0.48 10*3/MM3 (ref 0.1–0.9)
MONOCYTES NFR BLD AUTO: 4.3 % (ref 5–12)
NEUTROPHILS NFR BLD AUTO: 83.7 % (ref 42.7–76)
NEUTROPHILS NFR BLD AUTO: 9.3 10*3/MM3 (ref 1.7–7)
NRBC BLD AUTO-RTO: 0 /100 WBC (ref 0–0.2)
PLATELET # BLD AUTO: 319 10*3/MM3 (ref 140–450)
PMV BLD AUTO: 9.4 FL (ref 6–12)
POTASSIUM SERPL-SCNC: 3.4 MMOL/L (ref 3.5–5.2)
PROT SERPL-MCNC: 8 G/DL (ref 6–8.5)
RBC # BLD AUTO: 4.75 10*6/MM3 (ref 3.77–5.28)
SODIUM SERPL-SCNC: 137 MMOL/L (ref 136–145)
WBC NRBC COR # BLD: 11.1 10*3/MM3 (ref 3.4–10.8)
WHOLE BLOOD HOLD COAG: NORMAL

## 2022-09-22 PROCEDURE — 96375 TX/PRO/DX INJ NEW DRUG ADDON: CPT

## 2022-09-22 PROCEDURE — 25010000002 DIPHENHYDRAMINE PER 50 MG: Performed by: EMERGENCY MEDICINE

## 2022-09-22 PROCEDURE — 25010000002 METOCLOPRAMIDE PER 10 MG: Performed by: EMERGENCY MEDICINE

## 2022-09-22 PROCEDURE — 25010000002 MAGNESIUM SULFATE IN D5W 1G/100ML (PREMIX) 1-5 GM/100ML-% SOLUTION: Performed by: EMERGENCY MEDICINE

## 2022-09-22 PROCEDURE — 70450 CT HEAD/BRAIN W/O DYE: CPT

## 2022-09-22 PROCEDURE — 85025 COMPLETE CBC W/AUTO DIFF WBC: CPT | Performed by: EMERGENCY MEDICINE

## 2022-09-22 PROCEDURE — 80053 COMPREHEN METABOLIC PANEL: CPT | Performed by: EMERGENCY MEDICINE

## 2022-09-22 PROCEDURE — 25010000002 KETOROLAC TROMETHAMINE PER 15 MG: Performed by: EMERGENCY MEDICINE

## 2022-09-22 PROCEDURE — 96366 THER/PROPH/DIAG IV INF ADDON: CPT

## 2022-09-22 PROCEDURE — 84703 CHORIONIC GONADOTROPIN ASSAY: CPT | Performed by: EMERGENCY MEDICINE

## 2022-09-22 PROCEDURE — 99283 EMERGENCY DEPT VISIT LOW MDM: CPT

## 2022-09-22 PROCEDURE — 96365 THER/PROPH/DIAG IV INF INIT: CPT

## 2022-09-22 RX ORDER — KETOROLAC TROMETHAMINE 30 MG/ML
30 INJECTION, SOLUTION INTRAMUSCULAR; INTRAVENOUS ONCE
Status: COMPLETED | OUTPATIENT
Start: 2022-09-22 | End: 2022-09-22

## 2022-09-22 RX ORDER — METOCLOPRAMIDE HYDROCHLORIDE 5 MG/ML
10 INJECTION INTRAMUSCULAR; INTRAVENOUS ONCE
Status: COMPLETED | OUTPATIENT
Start: 2022-09-22 | End: 2022-09-22

## 2022-09-22 RX ORDER — MAGNESIUM SULFATE 1 G/100ML
1 INJECTION INTRAVENOUS ONCE
Status: COMPLETED | OUTPATIENT
Start: 2022-09-22 | End: 2022-09-22

## 2022-09-22 RX ORDER — DIPHENHYDRAMINE HYDROCHLORIDE 50 MG/ML
50 INJECTION INTRAMUSCULAR; INTRAVENOUS ONCE
Status: COMPLETED | OUTPATIENT
Start: 2022-09-22 | End: 2022-09-22

## 2022-09-22 RX ORDER — SODIUM CHLORIDE 0.9 % (FLUSH) 0.9 %
10 SYRINGE (ML) INJECTION AS NEEDED
Status: DISCONTINUED | OUTPATIENT
Start: 2022-09-22 | End: 2022-09-22 | Stop reason: HOSPADM

## 2022-09-22 RX ADMIN — DIPHENHYDRAMINE HYDROCHLORIDE 50 MG: 50 INJECTION INTRAMUSCULAR; INTRAVENOUS at 03:50

## 2022-09-22 RX ADMIN — METOCLOPRAMIDE 10 MG: 5 INJECTION, SOLUTION INTRAMUSCULAR; INTRAVENOUS at 03:50

## 2022-09-22 RX ADMIN — SODIUM CHLORIDE 1000 ML: 9 INJECTION, SOLUTION INTRAVENOUS at 03:49

## 2022-09-22 RX ADMIN — Medication 10 ML: at 03:50

## 2022-09-22 RX ADMIN — MAGNESIUM SULFATE HEPTAHYDRATE 1 G: 1 INJECTION, SOLUTION INTRAVENOUS at 03:49

## 2022-09-22 RX ADMIN — KETOROLAC TROMETHAMINE 30 MG: 30 INJECTION, SOLUTION INTRAMUSCULAR at 03:50

## 2022-09-22 NOTE — ED PROVIDER NOTES
Subjective   History of Present Illness  21-year-old female presents the emergency department with complaint of headache.  She reports recently developing recurrent migraines and has not had any imaging.  Denies any fevers or chills.  Reports some photophobia and nausea.  Denies fevers or chills.  Reports some bilateral hand tingling but denies any numbness or weakness.  No trauma.    Family history, surgical history, social history, current medications and allergies are reviewed with the patient and triage documentation and vitals are reviewed.    History provided by:  Patient   used: No        Review of Systems   Constitutional: Negative for chills and fever.   HENT: Negative for congestion and sore throat.    Eyes: Positive for photophobia. Negative for visual disturbance.   Respiratory: Negative for cough, shortness of breath and wheezing.    Cardiovascular: Negative for chest pain, palpitations and leg swelling.   Gastrointestinal: Positive for nausea. Negative for abdominal pain, diarrhea and vomiting.   Endocrine: Negative for polydipsia, polyphagia and polyuria.   Genitourinary: Negative for dysuria, frequency and urgency.   Musculoskeletal: Negative for arthralgias, back pain, myalgias and neck pain.   Skin: Negative for rash and wound.   Allergic/Immunologic: Negative.    Neurological: Positive for numbness and headaches. Negative for weakness and light-headedness.   Hematological: Negative.    Psychiatric/Behavioral: Negative.        Past Medical History:   Diagnosis Date   • Allergic    • Constipation    • GERD (gastroesophageal reflux disease)    • Headache    • Heart murmur     9 months   • Ingrown toenail    • Strep throat        Allergies   Allergen Reactions   • Penicillins Hives       Past Surgical History:   Procedure Laterality Date   • AVULSION TOENAIL PLATE     • ENDOSCOPY N/A 7/30/2021    Procedure: ESOPHAGOGASTRODUODENOSCOPY;  Surgeon: Bambi Carmona MD;  Location:   [facility];  Service: Gastroenterology;  Laterality: N/A;       Family History   Adopted: Yes       Social History     Socioeconomic History   • Marital status:    Tobacco Use   • Smoking status: Never Smoker   • Smokeless tobacco: Never Used   Vaping Use   • Vaping Use: Never used   Substance and Sexual Activity   • Alcohol use: No   • Drug use: No   • Sexual activity: Never           Objective   Physical Exam  Vitals and nursing note reviewed.   Constitutional:       General: She is not in acute distress.     Appearance: Normal appearance. She is normal weight. She is not ill-appearing, toxic-appearing or diaphoretic.   HENT:      Head: Normocephalic and atraumatic.      Mouth/Throat:      Mouth: Mucous membranes are moist.   Eyes:      General: No scleral icterus.     Extraocular Movements: Extraocular movements intact.      Conjunctiva/sclera: Conjunctivae normal.      Pupils: Pupils are equal, round, and reactive to light.   Cardiovascular:      Rate and Rhythm: Normal rate and regular rhythm.      Heart sounds: No murmur heard.  Pulmonary:      Effort: Pulmonary effort is normal.      Breath sounds: Normal breath sounds.   Abdominal:      General: Bowel sounds are normal.      Palpations: Abdomen is soft.   Musculoskeletal:         General: Normal range of motion.      Cervical back: Normal range of motion and neck supple. No tenderness.   Skin:     General: Skin is warm and dry.      Capillary Refill: Capillary refill takes less than 2 seconds.   Neurological:      General: No focal deficit present.      Mental Status: She is alert and oriented to person, place, and time.      Cranial Nerves: No cranial nerve deficit.      Sensory: No sensory deficit.      Motor: No weakness.      Coordination: Coordination normal.   Psychiatric:         Mood and Affect: Mood normal.         Behavior: Behavior normal.         Procedures  none         ED Course      Labs Reviewed   COMPREHENSIVE METABOLIC PANEL -  Abnormal; Notable for the following components:       Result Value    Glucose 148 (*)     Potassium 3.4 (*)     CO2 20.0 (*)     Alkaline Phosphatase 33 (*)     Anion Gap 17.0 (*)     All other components within normal limits    Narrative:     GFR Normal >60  Chronic Kidney Disease <60  Kidney Failure <15     CBC WITH AUTO DIFFERENTIAL - Abnormal; Notable for the following components:    WBC 11.10 (*)     RDW 12.0 (*)     RDW-SD 35.2 (*)     Neutrophil % 83.7 (*)     Lymphocyte % 11.1 (*)     Monocyte % 4.3 (*)     Neutrophils, Absolute 9.30 (*)     All other components within normal limits   HCG, SERUM, QUALITATIVE - Normal   CBC AND DIFFERENTIAL    Narrative:     The following orders were created for panel order CBC & Differential.  Procedure                               Abnormality         Status                     ---------                               -----------         ------                     CBC Auto Differential[[id number]]        Abnormal            Final result                 Please view results for these tests on the individual orders.   EXTRA TUBES    Narrative:     The following orders were created for panel order Extra Tubes.  Procedure                               Abnormality         Status                     ---------                               -----------         ------                     Light Blue Top[[id number]]                                   Final result                 Please view results for these tests on the individual orders.   LIGHT BLUE TOP     CT Head Without Contrast    Result Date: 9/22/2022  Narrative: EXAM:   CT Head Without Intravenous Contrast CLINICAL HISTORY:   The patient is 21 years old and is Female; headache TECHNIQUE:   Axial computed tomography images of the head/brain without intravenous contrast.  Sagittal and coronal reformatted images were created and reviewed.  This CT exam was performed using one or more of the following dose reduction techniques:   automated exposure control, adjustment of the mA and/or kV according to patient size, and/or use of iterative reconstruction technique. COMPARISON:   No relevant prior studies available. FINDINGS:   BRAIN:  Some CSF prominence anterior to the luis is likely incidental. An arachnoid cyst is less likely.  No hemorrhage.  No significant white matter disease. No midline shift.   VENTRICLES:  Unremarkable.  No ventriculomegaly.   BONES/JOINTS:  Unremarkable.  No acute fracture.   SOFT TISSUES:  Unremarkable.   SINUSES:  Moderate mucosal thickening in the visualized left maxillary sinus.   MASTOID AIR CELLS:  Unremarkable as visualized.  No mastoid effusion.     Impression: 1.  No acute intracranial findings. Consider MRI if symptoms persist. 2.  Moderate mucosal thickening in the visualized left maxillary sinus. Electronically signed by:  Albert Sanchez MD  9/22/2022 3:56 AM CDT Workstation: [id number]      MDM  Number of Diagnoses or Management Options     Amount and/or Complexity of Data Reviewed  Clinical lab tests: reviewed  Tests in the radiology section of CPT®: reviewed    Patient Progress  Patient progress: stable    Head CT reveals no acute intracranial abnormality.  Patient feels much better with symptomatic treatment and is advised on close outpatient follow-up.  Agreeable with plan and discharge.    Final diagnoses:   Acute nonintractable headache, unspecified headache type       ED Disposition  ED Disposition     ED Disposition   Discharge    Condition   Stable    Comment   --             Indira Paul, APRN  [address]  [address] [facility] [address]  [place] [id number] [phone]               Medication List      No changes were made to your prescriptions during this visit.          Delmer Kim, DO  09/22/22 0649

## 2022-09-29 ENCOUNTER — OFFICE VISIT (OUTPATIENT)
Dept: FAMILY MEDICINE CLINIC | Facility: CLINIC | Age: 22
End: 2022-09-29

## 2022-09-29 VITALS
HEART RATE: 106 BPM | SYSTOLIC BLOOD PRESSURE: 108 MMHG | DIASTOLIC BLOOD PRESSURE: 72 MMHG | WEIGHT: 138.2 LBS | BODY MASS INDEX: 25.43 KG/M2 | OXYGEN SATURATION: 97 % | HEIGHT: 62 IN

## 2022-09-29 DIAGNOSIS — Z30.41 ENCOUNTER FOR SURVEILLANCE OF CONTRACEPTIVE PILLS: ICD-10-CM

## 2022-09-29 DIAGNOSIS — G43.009 MIGRAINE WITHOUT AURA AND WITHOUT STATUS MIGRAINOSUS, NOT INTRACTABLE: Primary | ICD-10-CM

## 2022-09-29 PROCEDURE — 99214 OFFICE O/P EST MOD 30 MIN: CPT | Performed by: NURSE PRACTITIONER

## 2022-09-29 RX ORDER — RIMEGEPANT SULFATE 75 MG/75MG
75 TABLET, ORALLY DISINTEGRATING ORAL DAILY PRN
Qty: 10 TABLET | Refills: 0 | COMMUNITY
Start: 2022-09-29 | End: 2022-10-01 | Stop reason: SDUPTHER

## 2022-09-29 RX ORDER — NORGESTIMATE AND ETHINYL ESTRADIOL 7DAYSX3 LO
1 KIT ORAL DAILY
Qty: 28 TABLET | Refills: 12 | Status: SHIPPED | OUTPATIENT
Start: 2022-09-29 | End: 2022-12-19

## 2022-09-29 NOTE — PROGRESS NOTES
"Chief Complaint  Migraine    Subjective  Over the past several weeks has been having increasing number of migraines.  Has been taking acetaminophen/ibuprofen/Excedrin migraine with little relief of symptoms.  Headaches are worsened by bright lights and loud sounds.  Currently on Ortho-Cyclen for contraceptive management.  \"I have been on it for years and I really like it but I have gained weight.\"        Michela Kovacs presents to [facility] [place]  Migraine  Headache pattern:  Headache sometimes there, sometimes not at all  Initial event:  None  Recent changes:  Headaches come more often than they used to and headaches last longer than they used to  Frequency:  1 per week  ADL impact frequency:  1 per week  Time of day symptoms are worse:  No specific time of day  Do headaches wake patient from sleep?: No    Days of the week symptoms are worse:  No specific day of the week  Season symptoms are worse:  No particular season  Quality:  Dull and squeezing  Laterality:  Both sides at the same time  Location:  All over  Aggravating factors:  Dehydration, stress, noise and light  Abortive medications tried:  Acetaminophen and Excedrin migraine/tension  Contraception  This is a chronic problem. The current episode started more than 1 year ago. The problem occurs constantly. The problem has been unchanged. Associated symptoms comments: Weight gain. Nothing aggravates the symptoms.     Current Outpatient Medications on File Prior to Visit   Medication Sig Dispense Refill   • cetirizine (zyrTEC) 10 MG tablet Take 10 mg by mouth Daily.     • sertraline (Zoloft) 50 MG tablet Take 1 tablet by mouth Daily. 90 tablet 1     No current facility-administered medications on file prior to visit.     Allergies   Allergen Reactions   • Penicillins Hives       Objective   Vital Signs:  /72   Pulse 106   Ht 157.5 cm (62\")   Wt 62.7 kg (138 lb 3.2 oz)   SpO2 97%   BMI " "25.28 kg/m²   Estimated body mass index is 25.28 kg/m² as calculated from the following:    Height as of this encounter: 157.5 cm (62\").    Weight as of this encounter: 62.7 kg (138 lb 3.2 oz).      Physical Exam  Vitals and nursing note reviewed.   Constitutional:       Appearance: She is well-developed.   HENT:      Head: Normocephalic.      Right Ear: External ear normal.      Left Ear: External ear normal.   Eyes:      Pupils: Pupils are equal, round, and reactive to light.   Cardiovascular:      Rate and Rhythm: Normal rate and regular rhythm.      Heart sounds: Normal heart sounds.   Pulmonary:      Effort: Pulmonary effort is normal.      Breath sounds: Normal breath sounds.   Abdominal:      General: Bowel sounds are normal.      Palpations: Abdomen is soft.   Musculoskeletal:         General: Normal range of motion.      Cervical back: Normal range of motion and neck supple.   Skin:     General: Skin is warm.      Capillary Refill: Capillary refill takes less than 2 seconds.   Neurological:      Mental Status: She is alert and oriented to person, place, and time.   Psychiatric:         Behavior: Behavior normal.        Result Review :                Assessment and Plan   Diagnoses and all orders for this visit:    1. Migraine without aura and without status migrainosus, not intractable (Primary)  -     Rimegepant Sulfate (Nurtec) 75 MG tablet dispersible tablet; Take 1 tablet by mouth Daily As Needed (migraine).  Dispense: 10 tablet; Refill: 0    2. Encounter for surveillance of contraceptive pills  -     norgestimate-ethinyl estradiol (Ortho Tri-Cyclen Lo) 0.18/0.215/0.25 MG-25 MCG per tablet; Take 1 tablet by mouth Daily.  Dispense: 28 tablet; Refill: 12      1.  Migraine without aura and without status migrainous: Camilo:  Begin Nurtec as sampled   Educated on possible side effects of this medication including not limited to increased risk for nausea and dyspepsia  Seek solace in a dark, quiet environment " at onset of headache to reduce exacerbation  Seek emergency medical treatment for any new or worst headache of life, thunderclap headache    2.  Encounter for surveillance of contraceptive pills:  Discontinue Ortho-Cyclen  Begin Ortho Tri-Cyclen Lo  Educated to start new RX at time when she would start Birth control pack in order to keep her cycles regulated       Follow Up   Return in about 4 months (around 1/29/2023) for Annual physical.  Patient was given instructions and counseling regarding her condition or for health maintenance advice. Please see specific information pulled into the AVS if appropriate.         This document has been electronically signed by KAREN Thorne on September 30, 2022 14:21 CDT

## 2022-10-01 DIAGNOSIS — G43.009 MIGRAINE WITHOUT AURA AND WITHOUT STATUS MIGRAINOSUS, NOT INTRACTABLE: ICD-10-CM

## 2022-10-03 DIAGNOSIS — G43.009 MIGRAINE WITHOUT AURA AND WITHOUT STATUS MIGRAINOSUS, NOT INTRACTABLE: ICD-10-CM

## 2022-10-03 RX ORDER — RIMEGEPANT SULFATE 75 MG/75MG
75 TABLET, ORALLY DISINTEGRATING ORAL DAILY PRN
Qty: 10 TABLET | Refills: 0 | COMMUNITY
Start: 2022-10-03 | End: 2022-10-03 | Stop reason: SDUPTHER

## 2022-10-03 RX ORDER — RIMEGEPANT SULFATE 75 MG/75MG
75 TABLET, ORALLY DISINTEGRATING ORAL DAILY PRN
Qty: 10 TABLET | Refills: 0 | Status: SHIPPED | OUTPATIENT
Start: 2022-10-03 | End: 2022-10-26 | Stop reason: SDUPTHER

## 2022-10-26 DIAGNOSIS — G43.009 MIGRAINE WITHOUT AURA AND WITHOUT STATUS MIGRAINOSUS, NOT INTRACTABLE: ICD-10-CM

## 2022-10-26 RX ORDER — RIMEGEPANT SULFATE 75 MG/75MG
75 TABLET, ORALLY DISINTEGRATING ORAL DAILY PRN
Qty: 10 TABLET | Refills: 0 | Status: SHIPPED | OUTPATIENT
Start: 2022-10-26 | End: 2022-11-28

## 2022-11-28 DIAGNOSIS — R11.0 NAUSEA: ICD-10-CM

## 2022-11-28 DIAGNOSIS — G43.009 MIGRAINE WITHOUT AURA AND WITHOUT STATUS MIGRAINOSUS, NOT INTRACTABLE: Primary | ICD-10-CM

## 2022-11-28 RX ORDER — SUMATRIPTAN 50 MG/1
TABLET, FILM COATED ORAL
Qty: 9 TABLET | Refills: 3 | Status: SHIPPED | OUTPATIENT
Start: 2022-11-28 | End: 2023-01-04

## 2022-11-28 RX ORDER — ONDANSETRON 4 MG/1
4 TABLET, ORALLY DISINTEGRATING ORAL EVERY 8 HOURS PRN
Qty: 30 TABLET | Refills: 1 | Status: SHIPPED | OUTPATIENT
Start: 2022-11-28

## 2023-01-04 DIAGNOSIS — G43.009 MIGRAINE WITHOUT AURA AND WITHOUT STATUS MIGRAINOSUS, NOT INTRACTABLE: Primary | ICD-10-CM

## 2023-01-04 RX ORDER — TOPIRAMATE 25 MG/1
25 TABLET ORAL 2 TIMES DAILY
Qty: 30 TABLET | Refills: 2 | Status: SHIPPED | OUTPATIENT
Start: 2023-01-04 | End: 2023-03-27 | Stop reason: SDUPTHER

## 2023-01-04 RX ORDER — RIZATRIPTAN BENZOATE 10 MG/1
10 TABLET ORAL ONCE AS NEEDED
Qty: 9 TABLET | Refills: 2 | Status: SHIPPED | OUTPATIENT
Start: 2023-01-04 | End: 2023-03-08 | Stop reason: SDUPTHER

## 2023-01-24 ENCOUNTER — LAB (OUTPATIENT)
Dept: LAB | Facility: HOSPITAL | Age: 23
End: 2023-01-24
Payer: COMMERCIAL

## 2023-01-24 ENCOUNTER — OFFICE VISIT (OUTPATIENT)
Dept: FAMILY MEDICINE CLINIC | Facility: CLINIC | Age: 23
End: 2023-01-24
Payer: COMMERCIAL

## 2023-01-24 VITALS
DIASTOLIC BLOOD PRESSURE: 66 MMHG | HEIGHT: 62 IN | SYSTOLIC BLOOD PRESSURE: 104 MMHG | BODY MASS INDEX: 26.37 KG/M2 | WEIGHT: 143.3 LBS | OXYGEN SATURATION: 96 % | HEART RATE: 98 BPM

## 2023-01-24 DIAGNOSIS — Z23 NEED FOR TETANUS, DIPHTHERIA, AND ACELLULAR PERTUSSIS (TDAP) VACCINE: ICD-10-CM

## 2023-01-24 DIAGNOSIS — G43.009 MIGRAINE WITHOUT AURA AND WITHOUT STATUS MIGRAINOSUS, NOT INTRACTABLE: ICD-10-CM

## 2023-01-24 DIAGNOSIS — F33.0 MILD EPISODE OF RECURRENT MAJOR DEPRESSIVE DISORDER: ICD-10-CM

## 2023-01-24 DIAGNOSIS — Z11.59 NEED FOR HEPATITIS C SCREENING TEST: ICD-10-CM

## 2023-01-24 DIAGNOSIS — Z11.8 SCREENING FOR CHLAMYDIAL DISEASE: ICD-10-CM

## 2023-01-24 DIAGNOSIS — Z13.220 SCREENING FOR HYPERCHOLESTEROLEMIA: ICD-10-CM

## 2023-01-24 DIAGNOSIS — Z00.00 ANNUAL PHYSICAL EXAM: Primary | ICD-10-CM

## 2023-01-24 DIAGNOSIS — Z13.29 SCREENING FOR HYPOTHYROIDISM: ICD-10-CM

## 2023-01-24 DIAGNOSIS — N63.14 MASS OF LOWER INNER QUADRANT OF RIGHT BREAST: ICD-10-CM

## 2023-01-24 PROCEDURE — 90715 TDAP VACCINE 7 YRS/> IM: CPT | Performed by: NURSE PRACTITIONER

## 2023-01-24 PROCEDURE — 87491 CHLMYD TRACH DNA AMP PROBE: CPT

## 2023-01-24 PROCEDURE — 87661 TRICHOMONAS VAGINALIS AMPLIF: CPT

## 2023-01-24 PROCEDURE — 87591 N.GONORRHOEAE DNA AMP PROB: CPT

## 2023-01-24 PROCEDURE — 90471 IMMUNIZATION ADMIN: CPT | Performed by: NURSE PRACTITIONER

## 2023-01-24 PROCEDURE — 99395 PREV VISIT EST AGE 18-39: CPT | Performed by: NURSE PRACTITIONER

## 2023-01-25 ENCOUNTER — LAB (OUTPATIENT)
Dept: LAB | Facility: HOSPITAL | Age: 23
End: 2023-01-25
Payer: COMMERCIAL

## 2023-01-25 DIAGNOSIS — Z11.59 NEED FOR HEPATITIS C SCREENING TEST: ICD-10-CM

## 2023-01-25 DIAGNOSIS — Z13.220 SCREENING FOR HYPERCHOLESTEROLEMIA: ICD-10-CM

## 2023-01-25 DIAGNOSIS — Z11.8 SCREENING FOR CHLAMYDIAL DISEASE: ICD-10-CM

## 2023-01-25 DIAGNOSIS — G43.009 MIGRAINE WITHOUT AURA AND WITHOUT STATUS MIGRAINOSUS, NOT INTRACTABLE: ICD-10-CM

## 2023-01-25 DIAGNOSIS — Z13.29 SCREENING FOR HYPOTHYROIDISM: ICD-10-CM

## 2023-01-25 LAB
ALBUMIN SERPL-MCNC: 4.9 G/DL (ref 3.5–5.2)
ALBUMIN/GLOB SERPL: 1.4 G/DL
ALP SERPL-CCNC: 46 U/L (ref 39–117)
ALT SERPL W P-5'-P-CCNC: 92 U/L (ref 1–33)
ANION GAP SERPL CALCULATED.3IONS-SCNC: 7.9 MMOL/L (ref 5–15)
AST SERPL-CCNC: 62 U/L (ref 1–32)
BASOPHILS # BLD AUTO: 0.03 10*3/MM3 (ref 0–0.2)
BASOPHILS NFR BLD AUTO: 0.4 % (ref 0–1.5)
BILIRUB SERPL-MCNC: 0.5 MG/DL (ref 0–1.2)
BUN SERPL-MCNC: 11 MG/DL (ref 6–20)
BUN/CREAT SERPL: 15.3 (ref 7–25)
CALCIUM SPEC-SCNC: 9.6 MG/DL (ref 8.6–10.5)
CHLORIDE SERPL-SCNC: 99 MMOL/L (ref 98–107)
CHOLEST SERPL-MCNC: 206 MG/DL (ref 0–200)
CO2 SERPL-SCNC: 27.1 MMOL/L (ref 22–29)
CREAT SERPL-MCNC: 0.72 MG/DL (ref 0.57–1)
DEPRECATED RDW RBC AUTO: 37.7 FL (ref 37–54)
EGFRCR SERPLBLD CKD-EPI 2021: 120.7 ML/MIN/1.73
EOSINOPHIL # BLD AUTO: 0.18 10*3/MM3 (ref 0–0.4)
EOSINOPHIL NFR BLD AUTO: 2.2 % (ref 0.3–6.2)
ERYTHROCYTE [DISTWIDTH] IN BLOOD BY AUTOMATED COUNT: 12.4 % (ref 12.3–15.4)
GLOBULIN UR ELPH-MCNC: 3.4 GM/DL
GLUCOSE SERPL-MCNC: 97 MG/DL (ref 65–99)
HCT VFR BLD AUTO: 40.4 % (ref 34–46.6)
HCV AB SER DONR QL: NORMAL
HDLC SERPL-MCNC: 73 MG/DL (ref 40–60)
HGB BLD-MCNC: 13.4 G/DL (ref 12–15.9)
IMM GRANULOCYTES # BLD AUTO: 0.02 10*3/MM3 (ref 0–0.05)
IMM GRANULOCYTES NFR BLD AUTO: 0.2 % (ref 0–0.5)
LDLC SERPL CALC-MCNC: 94 MG/DL (ref 0–100)
LDLC/HDLC SERPL: 1.17 {RATIO}
LYMPHOCYTES # BLD AUTO: 1.87 10*3/MM3 (ref 0.7–3.1)
LYMPHOCYTES NFR BLD AUTO: 23.2 % (ref 19.6–45.3)
MCH RBC QN AUTO: 28.1 PG (ref 26.6–33)
MCHC RBC AUTO-ENTMCNC: 33.2 G/DL (ref 31.5–35.7)
MCV RBC AUTO: 84.7 FL (ref 79–97)
MONOCYTES # BLD AUTO: 0.71 10*3/MM3 (ref 0.1–0.9)
MONOCYTES NFR BLD AUTO: 8.8 % (ref 5–12)
NEUTROPHILS NFR BLD AUTO: 5.24 10*3/MM3 (ref 1.7–7)
NEUTROPHILS NFR BLD AUTO: 65.2 % (ref 42.7–76)
NRBC BLD AUTO-RTO: 0 /100 WBC (ref 0–0.2)
PLATELET # BLD AUTO: 353 10*3/MM3 (ref 140–450)
PMV BLD AUTO: 10.2 FL (ref 6–12)
POTASSIUM SERPL-SCNC: 4 MMOL/L (ref 3.5–5.2)
PROT SERPL-MCNC: 8.3 G/DL (ref 6–8.5)
RBC # BLD AUTO: 4.77 10*6/MM3 (ref 3.77–5.28)
SODIUM SERPL-SCNC: 134 MMOL/L (ref 136–145)
TRIGL SERPL-MCNC: 238 MG/DL (ref 0–150)
TSH SERPL DL<=0.05 MIU/L-ACNC: 2.55 UIU/ML (ref 0.27–4.2)
VLDLC SERPL-MCNC: 39 MG/DL (ref 5–40)
WBC NRBC COR # BLD: 8.05 10*3/MM3 (ref 3.4–10.8)

## 2023-01-25 PROCEDURE — 80061 LIPID PANEL: CPT

## 2023-01-25 PROCEDURE — 86803 HEPATITIS C AB TEST: CPT

## 2023-01-25 PROCEDURE — 80050 GENERAL HEALTH PANEL: CPT

## 2023-01-25 PROCEDURE — 36415 COLL VENOUS BLD VENIPUNCTURE: CPT

## 2023-01-26 DIAGNOSIS — E78.00 HYPERCHOLESTEROLEMIA: Primary | ICD-10-CM

## 2023-01-26 DIAGNOSIS — R74.8 ELEVATED LIVER ENZYMES: ICD-10-CM

## 2023-01-26 LAB
C TRACH RRNA CVX QL NAA+PROBE: NEGATIVE
N GONORRHOEA RRNA SPEC QL NAA+PROBE: NEGATIVE
TRICHOMONAS VAGINALIS PCR: NEGATIVE

## 2023-01-27 ENCOUNTER — TELEPHONE (OUTPATIENT)
Dept: FAMILY MEDICINE CLINIC | Facility: CLINIC | Age: 23
End: 2023-01-27
Payer: COMMERCIAL

## 2023-01-28 NOTE — TELEPHONE ENCOUNTER
Per KAREN Jacobson, Ms. Kovacs  has been called with recent lab results & recommendations.  Continue current medications and follow-up as planned or sooner if any problems.       ----- Message from KAREN Thorne sent at 1/26/2023  6:54 AM CST -----  Her liver enzymes were elevated.  Has she been using Tylenol/acetaminophen products or drinking alcohol?  She needs a repeat liver enzymes in 2 weeks.  Sodium level was slightly low.  She needs to add a pinch of salt to her diet.  Cholesterol levels were increased.  She needs to reduce saturated fats in her diet and increase low-impact exercises such as walking.  She needs repeat fasting lipid panel in 3 months.  All other labs were essentially normal.

## 2023-01-28 NOTE — PROGRESS NOTES
Per KAREN Jacobson, Ms. Kovacs  has been called with recent lab results & recommendations.  Continue current medications and follow-up as planned or sooner if any problems.

## 2023-02-07 ENCOUNTER — LAB (OUTPATIENT)
Dept: LAB | Facility: HOSPITAL | Age: 23
End: 2023-02-07
Payer: COMMERCIAL

## 2023-02-07 DIAGNOSIS — E78.00 HYPERCHOLESTEROLEMIA: ICD-10-CM

## 2023-02-07 DIAGNOSIS — R74.8 ELEVATED LIVER ENZYMES: ICD-10-CM

## 2023-02-07 LAB
ALBUMIN SERPL-MCNC: 4.5 G/DL (ref 3.5–5.2)
ALBUMIN/GLOB SERPL: 1.4 G/DL
ALP SERPL-CCNC: 40 U/L (ref 39–117)
ALT SERPL W P-5'-P-CCNC: 39 U/L (ref 1–33)
ANION GAP SERPL CALCULATED.3IONS-SCNC: 15.6 MMOL/L (ref 5–15)
AST SERPL-CCNC: 37 U/L (ref 1–32)
BILIRUB SERPL-MCNC: 0.3 MG/DL (ref 0–1.2)
BUN SERPL-MCNC: 12 MG/DL (ref 6–20)
BUN/CREAT SERPL: 18.5 (ref 7–25)
CALCIUM SPEC-SCNC: 9.6 MG/DL (ref 8.6–10.5)
CHLORIDE SERPL-SCNC: 102 MMOL/L (ref 98–107)
CHOLEST SERPL-MCNC: 178 MG/DL (ref 0–200)
CO2 SERPL-SCNC: 22.4 MMOL/L (ref 22–29)
CREAT SERPL-MCNC: 0.65 MG/DL (ref 0.57–1)
EGFRCR SERPLBLD CKD-EPI 2021: 127.1 ML/MIN/1.73
GLOBULIN UR ELPH-MCNC: 3.2 GM/DL
GLUCOSE SERPL-MCNC: 86 MG/DL (ref 65–99)
HDLC SERPL-MCNC: 68 MG/DL (ref 40–60)
LDLC SERPL CALC-MCNC: 70 MG/DL (ref 0–100)
LDLC/HDLC SERPL: 0.88 {RATIO}
POTASSIUM SERPL-SCNC: 4.3 MMOL/L (ref 3.5–5.2)
PROT SERPL-MCNC: 7.7 G/DL (ref 6–8.5)
SODIUM SERPL-SCNC: 140 MMOL/L (ref 136–145)
TRIGL SERPL-MCNC: 252 MG/DL (ref 0–150)
VLDLC SERPL-MCNC: 40 MG/DL (ref 5–40)

## 2023-02-07 PROCEDURE — 80061 LIPID PANEL: CPT

## 2023-02-07 PROCEDURE — 80053 COMPREHEN METABOLIC PANEL: CPT

## 2023-02-08 ENCOUNTER — TELEPHONE (OUTPATIENT)
Dept: FAMILY MEDICINE CLINIC | Facility: CLINIC | Age: 23
End: 2023-02-08
Payer: COMMERCIAL

## 2023-02-09 NOTE — TELEPHONE ENCOUNTER
Per KAREN Jacobson, MsFermín has been called with recent lab results & recommendations.  Continue current medications and follow-up as planned or sooner if any problems.       ----- Message from KAREN Thorne sent at 2/8/2023  6:40 AM CST -----  Although ALT and AST are now just slightly elevated they have drastically improved.  She needs to continue to avoid use of any Tylenol/acetaminophen products as well as alcohol.  Total cholesterol is now within normal limits.  Triglycerides have increased slightly but good cholesterol has had a slight improvement.  She needs to continue to reduce saturated fats in her diet and increase low-impact exercises such as walking.    
negative

## 2023-02-09 NOTE — PROGRESS NOTES
Per KAREN Jacobson, Ms, Fermín has been called with recent lab results & recommendations.  Continue current medications and follow-up as planned or sooner if any problems.

## 2023-02-10 ENCOUNTER — TELEPHONE (OUTPATIENT)
Dept: FAMILY MEDICINE CLINIC | Facility: CLINIC | Age: 23
End: 2023-02-10
Payer: COMMERCIAL

## 2023-02-11 NOTE — TELEPHONE ENCOUNTER
Per KAREN Jacobson, Ms. Kovacs  has been called with recent Right Breast US results & recommendations.  Continue current medications and follow-up as planned or sooner if any problems.       ----- Message from KAREN Thorne sent at 2/9/2023  6:35 AM CST -----  Ultrasound of the right breast showed no sonographic abnormality.  Recommend routine mammogram screening starting at age 40.

## 2023-02-11 NOTE — PROGRESS NOTES
Per KAREN Jacobson, Ms. Kovacs  has been called with recent Right Breast US results & recommendations.  Continue current medications and follow-up as planned or sooner if any problems.

## 2023-02-22 ENCOUNTER — OFFICE VISIT (OUTPATIENT)
Dept: OBSTETRICS AND GYNECOLOGY | Facility: CLINIC | Age: 23
End: 2023-02-22
Payer: COMMERCIAL

## 2023-02-22 VITALS
WEIGHT: 142 LBS | HEIGHT: 62 IN | BODY MASS INDEX: 26.13 KG/M2 | SYSTOLIC BLOOD PRESSURE: 102 MMHG | DIASTOLIC BLOOD PRESSURE: 62 MMHG

## 2023-02-22 DIAGNOSIS — Z32.02 NEGATIVE PREGNANCY TEST: ICD-10-CM

## 2023-02-22 DIAGNOSIS — Z30.430 ENCOUNTER FOR IUD INSERTION: Primary | ICD-10-CM

## 2023-02-22 DIAGNOSIS — Z11.3 SCREENING EXAMINATION FOR STD (SEXUALLY TRANSMITTED DISEASE): ICD-10-CM

## 2023-02-22 LAB
B-HCG UR QL: NEGATIVE
EXPIRATION DATE: NORMAL
INTERNAL NEGATIVE CONTROL: NEGATIVE
INTERNAL POSITIVE CONTROL: POSITIVE
Lab: NORMAL

## 2023-02-22 PROCEDURE — 87491 CHLMYD TRACH DNA AMP PROBE: CPT | Performed by: NURSE PRACTITIONER

## 2023-02-22 PROCEDURE — 87591 N.GONORRHOEAE DNA AMP PROB: CPT | Performed by: NURSE PRACTITIONER

## 2023-02-22 PROCEDURE — 87661 TRICHOMONAS VAGINALIS AMPLIF: CPT | Performed by: NURSE PRACTITIONER

## 2023-02-22 PROCEDURE — 58300 INSERT INTRAUTERINE DEVICE: CPT | Performed by: NURSE PRACTITIONER

## 2023-02-22 PROCEDURE — 81025 URINE PREGNANCY TEST: CPT | Performed by: NURSE PRACTITIONER

## 2023-02-22 RX ORDER — COPPER 313.4 MG/1
INTRAUTERINE DEVICE INTRAUTERINE ONCE
Status: COMPLETED | OUTPATIENT
Start: 2023-02-22 | End: 2023-02-22

## 2023-02-22 RX ADMIN — COPPER: 313.4 INTRAUTERINE DEVICE INTRAUTERINE at 11:31

## 2023-02-22 NOTE — PROGRESS NOTES
IUD Insertion    Patient's last menstrual period was 02/16/2023 (exact date). UPT negative.    Date of procedure:  2/22/2023    Risks and benefits discussed? yes  All questions answered? yes  Consents given by The patient  Written consent obtained? yes      Procedure documentation:    After verifying the patient had a low probability of being pregnant and met the criteria for insertion, a speculum has placed and the cervix was cleansed with an antiseptic solution.  The anterior lip of the cervix was grasped and the uterine cavity was gently sounded. There was mild difficulty passing the sound through the cervix.  Cervical dilation did not need to be performed prior to placing the IUD.  The uterus was retroflexed position and sounded to 6.75 cms.  The ParaGard was then prepared per the manufacturers instructions.    The ParaGard was advanced to the fundus and then the arms were released from the sheath.  The string was cut 3 cms in length.  Bleeding from the cervix was scant.    Paraguard  [phone]    She tolerated the procedure without any difficulty.    Post procedure instructions: Call if any fever, excessive bleeding, or pain.    Follow up needed:  6 week for IUD check up     This note was electronically signed.    KAREN Rueda  February 22, 2023

## 2023-03-03 DIAGNOSIS — R42 DIZZINESS: Primary | ICD-10-CM

## 2023-03-08 DIAGNOSIS — G43.009 MIGRAINE WITHOUT AURA AND WITHOUT STATUS MIGRAINOSUS, NOT INTRACTABLE: ICD-10-CM

## 2023-03-09 RX ORDER — RIZATRIPTAN BENZOATE 10 MG/1
10 TABLET ORAL ONCE AS NEEDED
Qty: 9 TABLET | Refills: 2 | Status: SHIPPED | OUTPATIENT
Start: 2023-03-09

## 2023-03-13 ENCOUNTER — HOSPITAL ENCOUNTER (OUTPATIENT)
Dept: PHYSICAL THERAPY | Facility: HOSPITAL | Age: 23
Setting detail: THERAPIES SERIES
Discharge: HOME | End: 2023-03-13
Payer: COMMERCIAL

## 2023-03-13 DIAGNOSIS — R42 DIZZINESS: Primary | ICD-10-CM

## 2023-03-13 PROCEDURE — 97162 PT EVAL MOD COMPLEX 30 MIN: CPT | Performed by: PHYSICAL THERAPIST

## 2023-03-13 PROCEDURE — 97140 MANUAL THERAPY 1/> REGIONS: CPT | Performed by: PHYSICAL THERAPIST

## 2023-03-13 NOTE — THERAPY EVALUATION
Outpatient Physical Therapy Vestibular Initial Evaluation  [facility]     Patient Name: Michela Kovacs  : 2000  MRN: [id number]  Today's Date: 3/13/2023      Visit Date: 2023    Attendance:  (90/yr)  Subjective Improvement: n/a  Next MD Appt: none with PCP  Recert Date: 4/3/23    Therapy Diagnosis: 1) Vertigo/Dizziness; 2) Headaches       Past Medical History:   Diagnosis Date   • Allergic    • Constipation    • GERD (gastroesophageal reflux disease)    • Headache    • Heart murmur     9 months   • Ingrown toenail    • Migraine 2022   • Strep throat    • Urinary tract infection 2020        Past Surgical History:   Procedure Laterality Date   • AVULSION TOENAIL PLATE     • ENDOSCOPY N/A 2021    Procedure: ESOPHAGOGASTRODUODENOSCOPY;  Surgeon: Bambi Carmona MD;  Location: [facility] ENDOSCOPY;  Service: Gastroenterology;  Laterality: N/A;     Allergies   Allergen Reactions   • Penicillins Hives         Visit Dx:     ICD-10-CM ICD-9-CM   1. Dizziness  R42 780.4        Patient History     Row Name 23 1600             History    Chief Complaint Difficulty with daily activities;Pain;Headache  -SS      Other Type of Pain Head  -SS      Brief Description of Current Complaint Patient notes that she has been experiencing strong headaches every day for past 3 weeks. Symptoms more when she goes to bed at night. Notes bursts of headaches. Headaches have dizziness, nausea, confusion with them. Has not noticed a particular movement to cause dizziness. Symptoms come and go throughout the day. Has struggled with headaches all her life. Developed migraines 2022. Migraines have increased in frequency over past 3 weeks. Has been off Topamax without change in symptoms. Sensitive to sound and light. Muscle tension in neck and shoulders.  -SS      Patient/Caregiver Goals Relieve pain;Relief from dizziness  -SS      Current Tobacco Use no  -SS      Smoking Status never   "-SS      Patient's Rating of General Health Very good  -SS      Hand Dominance right-handed  -SS      Occupation/sports/leisure activities [employer] - [employer], occasional missed work due headaches. Hobbies: walking, spend time with loved ones  -SS      What clinical tests have you had for this problem? CT scan  -SS      Results of Clinical Tests per Radiology report -- BRAIN:  Some CSF prominence anterior to the luis is likely  incidental. An arachnoid cyst is less likely.  No hemorrhage.  No  significant white matter disease. No midline shift.    VENTRICLES:  Unremarkable.  No ventriculomegaly.    BONES/JOINTS:  Unremarkable.  No acute fracture.    SOFT TISSUES:  Unremarkable.    SINUSES:  Moderate mucosal thickening in the visualized left  maxillary sinus.    MASTOID AIR CELLS:  Unremarkable as visualized.  No mastoid  effusion.  -SS         Pain     Pain Location Head  -SS      Pain at Present 2;3;4  -SS      Pain at Best 2  over past 1 month  -SS      Pain at Worst 6;7;8  over past 1 month  -SS      Pain Frequency Constant/continuous  -SS      Pain Description Pressure;Throbbing;Stabbing  \"my brain is swelling and there's nowhere for it to go\"  -SS      What Performance Factors Make the Current Problem(s) WORSE? light, sound, stress  -      What Performance Factors Make the Current Problem(s) BETTER? rizatriptan  -SS      Is your sleep disturbed? Yes  -SS      Is medication used to assist with sleep? Yes  melatonin occasionally  -SS      Difficulties at work? yes  -SS      Difficulties with ADL's? yes  -SS      Difficulties with recreational activities? yes  -SS         Fall Risk Assessment    Any falls in the past year: No  -SS      Does patient have a fear of falling No  -SS         Safety    Are you being hurt, hit, or frightened by anyone at home or in your life? No  -SS      Have you had any of the following issues with Depression;Anxiety  -SS            User Key  (r) = Recorded By, " "(t) = Taken By, (c) = Cosigned By    Initials Name Provider Type    SS Deven Andrews, PT, DPT, CHT Physical Therapist                 Vestibular Bri     Row Name 03/13/23 1600             Subjective Comments    Subjective Comments see Therapy Patient History  -SS         Pain Assessment    Pain Assessment 0-10  see Therapy Patient History  -SS      Pain Score --  2-4  -SS      Post Pain Score 4  -SS         Vestibular Objective    Fall History no  -SS      Use of Assistive Devices none  -SS         Cognition    Orientation Level Oriented to place;Oriented to time;Oriented to situation;Oriented to person  -SS         Symptom Behavior    Type of Dizziness Spinning  -         Oculomotor Exam Fixation Present    Ocular ROM --  ROM WNLs; \"more pressure\" looking downward, \"additional pressure\" and \"cringe feeling\" looking left; no symptoms looking right or upward  -      Spontaneous Nystagmus Absent  -      Gaze-induced Nystagmus Absent  -      Head Shaking Horizontal --  \"pressure\" with eyes open; increased dizziness eyes closed  -      Head Shaking Vertical --  no change in symptoms eyes open or eyes closed  -      Smooth Pursuit Normal  -      Convergence Abnormal  -         Vestibulo-Ocular Reflex (VOR)    VOR 1 Head Only no change in symptoms at 30, 60, 90 bpm  -         Positional Testing    Positional Testing Without infrared goggles       Deep Head Hang symptom reproduction, no nystagmus  -      Deep Head Hang Onset immediate  -      Deep Head Hang Duration 45 sec  -      Hussein-Hallpike Right --  negative  -SS      Hussein-Hallpike Left --  negative  -SS      Horizontal Roll Test Right --  slight symptom reproduction  -SS      Horizontal Roll Test Left --  slight symptom reproduction  -         Cervicogenic Assessment    Cervicogenic Assess Right rotation of mid-cervical spine. Trigger point R>L rectus capitus major and obliquus capitis superior and L SCM. TTP without trigger B UT.  " -SS            User Key  (r) = Recorded By, (t) = Taken By, (c) = Cosigned By    Initials Name Provider Type    Deven Huntley, PT, DPT, CHT Physical Therapist                            Therapy Education  Education Details: self-massage suboccipital muscles and SCM  Given: HEP  Program: New  How Provided: Verbal, Demonstration  Provided to: Patient  Level of Understanding: Verbalized       OP Exercises     Row Name 03/13/23 1600             Subjective Comments    Subjective Comments see Therapy Patient History  -SS         Exercise 1    Exercise Name 1 Deep head hang  -SS      Cueing 1 Verbal;Tactile  -SS      Sets 1 2  -SS            User Key  (r) = Recorded By, (t) = Taken By, (c) = Cosigned By    Initials Name Provider Type    Deven Huntley, PT, DPT, CHT Physical Therapist              Manual Rx (last 36 hours)     Manual Treatments     Row Name 03/13/23 1500             Manual Rx 1    Manual Rx 1 Location cervical spine  -SS      Manual Rx 1 Type joint mobilization  -SS      Manual Rx 1 Grade 3  -SS         Manual Rx 2    Manual Rx 2 Location suboccipital muscles  -SS      Manual Rx 2 Type MFR  -SS         Manual Rx 3    Manual Rx 3 Location L SCM  -SS      Manual Rx 3 Type MFR  -SS            User Key  (r) = Recorded By, (t) = Taken By, (c) = Cosigned By    Initials Name Provider Type    Deven Huntley, PT, DPT, CHT Physical Therapist                           PT OP Goals     Row Name 03/13/23 1600          PT Short Term Goals    STG Date to Achieve 04/03/23  -SS     STG 1 Note  >/= 50% subjective improvement.  -SS     STG 2 Negative BPPV testing.  -SS        Long Term Goals    LTG Date to Achieve 05/15/23  -SS     LTG 1 Independent with HEP/self-management.  -SS     LTG 2 Resolution of daily headaches.  -SS     LTG 3 Resume PLOF.  -SS        Time Calculation    PT Goal Re-Cert Due Date 04/03/23  -SS           User Key  (r) = Recorded By, (t) = Taken By, (c) = Cosigned By     Initials Name Provider Type     Deven Andrews, PT, DPT, CHT Physical Therapist                 PT Assessment/Plan     Row Name 03/13/23 1700          PT Assessment    Functional Limitations Limitation in home management;Limitations in community activities;Limitations in functional capacity and performance;Performance in leisure activities;Performance in self-care ADL;Performance in work activities  -     Impairments Pain;Joint mobility  dizziness  -     Assessment Comments Patient is having symptoms that could be related to trigger points or vertigo. Vertigo appears to be related to both anterior canals and possible migraine aura. Patient reports more pressure at apex of skull and glabella.  -     Rehab Potential --  Good/fair.  -     Patient/caregiver participated in establishment of treatment plan and goals Yes  -        PT Plan    PT Frequency 2x/week  -     Predicted Duration of Therapy Intervention (PT) 3-4 weeks with further to be determined  -     PT Plan Comments Canalith repositioning for anterior and horizontal canals, cervical joint mobilization, MFR/trigger point release cervical paraspinals, suboccipitals, and SCM, stretching and strengthening as indicated.  -           User Key  (r) = Recorded By, (t) = Taken By, (c) = Cosigned By    Initials Name Provider Type     Deven Andrews, PT, DPT, CHT Physical Therapist                           Time Calculation:   Start Time: 1607  Stop Time: 1707  Time Calculation (min): 60 min   Therapy Charges for Today     Code Description Service Date Service Provider Modifiers Qty    46009310134 HC PT EVAL MOD COMPLEXITY 3 3/13/2023 Deven Andrews, PT, DPT, CHT GP 1    94094671301 HC PT MANUAL THERAPY EA 15 MIN 3/13/2023 Deven Andrews PT, DPT, CHT GP 1                    Deven Andrews PT, DPT, CHT  3/13/2023

## 2023-03-15 ENCOUNTER — HOSPITAL ENCOUNTER (OUTPATIENT)
Dept: PHYSICAL THERAPY | Facility: HOSPITAL | Age: 23
Setting detail: THERAPIES SERIES
Discharge: HOME | End: 2023-03-15
Payer: COMMERCIAL

## 2023-03-15 DIAGNOSIS — R42 DIZZINESS: Primary | ICD-10-CM

## 2023-03-15 PROCEDURE — 97110 THERAPEUTIC EXERCISES: CPT

## 2023-03-20 ENCOUNTER — HOSPITAL ENCOUNTER (OUTPATIENT)
Dept: PHYSICAL THERAPY | Facility: HOSPITAL | Age: 23
Setting detail: THERAPIES SERIES
Discharge: HOME | End: 2023-03-20
Payer: COMMERCIAL

## 2023-03-20 DIAGNOSIS — R42 DIZZINESS: Primary | ICD-10-CM

## 2023-03-20 PROCEDURE — 97110 THERAPEUTIC EXERCISES: CPT

## 2023-03-20 NOTE — THERAPY TREATMENT NOTE
Outpatient Physical Therapy Ortho Treatment Note  [facility]     Patient Name: Michela Kovacs  : 2000  MRN: [id number]  Today's Date: 3/20/2023      Visit Date: 2023   Attendance: 3/3  Subjective improvement: 80%  Recert: 4/3/23  MD Appointment: TBD      Visit Dx:    ICD-10-CM ICD-9-CM   1. Dizziness  R42 780.4       Patient Active Problem List   Diagnosis   • Palpitations   • SOB (shortness of breath)   • Viral respiratory illness   • Gastroesophageal reflux disease        Past Medical History:   Diagnosis Date   • Allergic    • Constipation    • GERD (gastroesophageal reflux disease)    • Headache    • Heart murmur     9 months   • Ingrown toenail    • Migraine 2022   • Strep throat    • Urinary tract infection 2020        Past Surgical History:   Procedure Laterality Date   • AVULSION TOENAIL PLATE     • ENDOSCOPY N/A 2021    Procedure: ESOPHAGOGASTRODUODENOSCOPY;  Surgeon: Bambi Carmona MD;  Location: [facility] ENDOSCOPY;  Service: Gastroenterology;  Laterality: N/A;                        PT Assessment/Plan     Row Name 23 1600          PT Assessment    Functional Limitations Limitation in home management;Limitations in community activities;Limitations in functional capacity and performance;Performance in leisure activities;Performance in self-care ADL;Performance in work activities  -EM     Impairments Pain;Joint mobility  -EM     Assessment Comments Pt ana tx well. She is responding very well to PT thus far with improved symptoms in all aspects. Pt verbalized HEP with good understanding. 1 goal met this tx as she feels she is 80% improved.  -EM     Rehab Potential Good  -EM     Patient/caregiver participated in establishment of treatment plan and goals Yes  -EM     Patient would benefit from skilled therapy intervention Yes  -EM        PT Plan    PT Frequency 2x/week  -EM     Predicted Duration of Therapy Intervention (PT) 3-4 weeks with further  "TBD  -EM     PT Plan Comments Manual PRN.Cont to focus on deep cervical strenghtening and scap stab for improved posture  -EM           User Key  (r) = Recorded By, (t) = Taken By, (c) = Cosigned By    Initials Name Provider Type    EM Earle Mccloud PTA Physical Therapist Assistant                 Modalities     Row Name 03/20/23 1400             Moist Heat    MH Applied Yes  -EM      Location C-Spine  -EM      PT Moist Heat Minutes 10  -EM      MH Prior to Rx Yes  -EM            User Key  (r) = Recorded By, (t) = Taken By, (c) = Cosigned By    Initials Name Provider Type    EM Earle Mccloud PTA Physical Therapist Assistant               OP Exercises     Row Name 03/20/23 1400             Subjective Comments    Subjective Comments Pt reports not having to take any pain meds, no dizziness, and decreased frequency and intensity of HA.  -EM         Subjective Pain    Able to rate subjective pain? yes  -EM      Pre-Treatment Pain Level 2  -EM      Post-Treatment Pain Level 1  -EM         Exercise 1    Exercise Name 1 MHP C-Spine  -EM      Time 1 10'  -EM         Exercise 2    Exercise Name 2 B UT S  -EM      Sets 2 3  -EM      Time 2 30\"  -EM         Exercise 3    Exercise Name 3 B LS S  -EM      Sets 3 3  -EM      Time 3 30\"  -EM         Exercise 4    Exercise Name 4 B Stargazer S  -EM      Sets 4 3  -EM      Time 4 30\"  -EM         Exercise 5    Exercise Name 5 B Prone YTI  -EM      Sets 5 1  -EM      Reps 5 20  -EM         Exercise 6    Exercise Name 6 Scap retraction TB  -EM      Sets 6 1  -EM      Reps 6 20  -EM      Additional Comments GTB  -EM         Exercise 7    Exercise Name 7 No Money with TB  -EM      Sets 7 1  -EM      Reps 7 20  -EM      Additional Comments GTB  -EM            User Key  (r) = Recorded By, (t) = Taken By, (c) = Cosigned By    Initials Name Provider Type    Earle Serrano PTA Physical Therapist Assistant                              PT OP Goals     Row Name 03/20/23 1600          PT " Short Term Goals    STG Date to Achieve 04/03/23  -EM     STG 1 Note  >/= 50% subjective improvement.  -EM     STG 1 Progress Met   -EM     STG 2 Negative BPPV testing.  -EM     STG 2 Progress Not Met  -EM        Long Term Goals    LTG Date to Achieve 05/15/23  -EM     LTG 1 Independent with HEP/self-management.  -EM     LTG 1 Progress Not Met  -EM     LTG 2 Resolution of daily headaches.  -EM     LTG 2 Progress Not Met  -EM     LTG 3 Resume PLOF.  -EM     LTG 3 Progress Not Met  -EM        Time Calculation    PT Goal Re-Cert Due Date 04/03/23  -EM           User Key  (r) = Recorded By, (t) = Taken By, (c) = Cosigned By    Initials Name Provider Type    EM Earle Mccloud PTA Physical Therapist Assistant                               Time Calculation:   Start Time: 1435  Stop Time: 1513  Time Calculation (min): 38 min  PT Non-Billable Time (min): 10 min  Total Timed Code Minutes- PT: 28 minute(s)  Untimed Charges  PT Moist Heat Minutes: 10  Total Minutes  Untimed Charges Total Minutes: 10   Total Minutes: 10  Therapy Charges for Today     Code Description Service Date Service Provider Modifiers Qty    49334300458 HC PT THER PROC EA 15 MIN 3/20/2023 Earle Mccloud PTA GP, CQ 2    79113333754 HC PT THER SUPP EA 15 MIN 3/20/2023 Earle Mccloud PTA GP 1                    Earle Mccloud PTA  3/20/2023

## 2023-03-22 ENCOUNTER — HOSPITAL ENCOUNTER (OUTPATIENT)
Dept: PHYSICAL THERAPY | Facility: HOSPITAL | Age: 23
Setting detail: THERAPIES SERIES
Discharge: HOME | End: 2023-03-22
Payer: COMMERCIAL

## 2023-03-22 DIAGNOSIS — R42 DIZZINESS: Primary | ICD-10-CM

## 2023-03-22 PROCEDURE — 97110 THERAPEUTIC EXERCISES: CPT

## 2023-03-22 PROCEDURE — 97140 MANUAL THERAPY 1/> REGIONS: CPT

## 2023-03-22 NOTE — THERAPY TREATMENT NOTE
Outpatient Physical Therapy Ortho Treatment Note  [facility]     Patient Name: Michela Kovacs  : 2000  MRN: [id number]  Today's Date: 3/22/2023      Visit Date: 2023  Subjective Improvement: 90%  MD visit: MENDOZA  Visit Number:   Total Approved:90  Recert Date: 4/3/23  Visit Dx:    ICD-10-CM ICD-9-CM   1. Dizziness  R42 780.4       Patient Active Problem List   Diagnosis   • Palpitations   • SOB (shortness of breath)   • Viral respiratory illness   • Gastroesophageal reflux disease        Past Medical History:   Diagnosis Date   • Allergic    • Constipation    • GERD (gastroesophageal reflux disease)    • Headache    • Heart murmur     9 months   • Ingrown toenail    • Migraine 2022   • Strep throat    • Urinary tract infection 2020        Past Surgical History:   Procedure Laterality Date   • AVULSION TOENAIL PLATE     • ENDOSCOPY N/A 2021    Procedure: ESOPHAGOGASTRODUODENOSCOPY;  Surgeon: Bambi Carmona MD;  Location: [facility] ENDOSCOPY;  Service: Gastroenterology;  Laterality: N/A;        PT Ortho     Row Name 23 1300       Subjective Comments    Subjective Comments Pt reports drove self today. Pt reports dizziness on a 1-10 scale she is a 1/10.   Pt reports 90% improved.  -TL       Subjective Pain    Able to rate subjective pain? yes  -TL    Pre-Treatment Pain Level 0  -TL          User Key  (r) = Recorded By, (t) = Taken By, (c) = Cosigned By    Initials Name Provider Type    TL Desiree Carroll PTA Physical Therapist Assistant                             PT Assessment/Plan     Row Name 23 1400          PT Assessment    Assessment Comments Pt reports 90% improved. Pt reports not having daily headaches.  Pt very tight upper trap , levator, mid trap muslces. PTA provided pressures to scalences and pincer to SCM muscles.  Pt has met short term goal #1 and progressing toward no headaches daily. PTA wants to make sure pt is consistent without  having headaches daily.  -TL        PT Plan    PT Frequency 2x/week  -TL     Predicted Duration of Therapy Intervention (PT) 3-4 weeks  -TL     PT Plan Comments add chin tucks and isometric ex to c-spine  -TL           User Key  (r) = Recorded By, (t) = Taken By, (c) = Cosigned By    Initials Name Provider Type    TL Desiree Carroll PTA Physical Therapist Assistant                   OP Exercises     Row Name 03/22/23 1300             Subjective Comments    Subjective Comments Pt reports drove self today. Pt reports dizziness on a 1-10 scale she is a 1/10.   Pt reports 90% improved.  -TL         Subjective Pain    Able to rate subjective pain? yes  -TL      Pre-Treatment Pain Level 0  -TL         Exercise 1    Exercise Name 1 upper trap S  -TL      Sets 1 2  -TL      Time 1 30 sec hold  -TL         Exercise 2    Exercise Name 2 both levator S  -TL      Sets 2 2  -TL      Time 2 30 sec hold  -TL         Exercise 3    Exercise Name 3 SCM/scalenes  -TL      Sets 3 2  -TL      Time 3 30 sec hold  -TL         Exercise 4    Exercise Name 4 shld row mid GTB  -TL      Sets 4 2  -TL      Reps 4 10  -TL      Time 4 5 sec hold  -TL         Exercise 5    Exercise Name 5 shld row high GTB  -TL      Sets 5 2  -TL      Reps 5 10  -TL      Time 5 5 sec hold  -TL         Exercise 6    Exercise Name 6 lat pull down GTB  -TL            User Key  (r) = Recorded By, (t) = Taken By, (c) = Cosigned By    Initials Name Provider Type    TL Desiree Carroll PTA Physical Therapist Assistant                         Manual Rx (last 36 hours)     Manual Treatments     Row Name 03/22/23 1300             Manual Rx 1    Manual Rx 1 Location tissue lengthening with c-spine arom  -TL         Manual Rx 2    Manual Rx 2 Location upper trap  -TL      Manual Rx 2 Grade pincer/opposing thumbs  -TL         Manual Rx 3    Manual Rx 3 Location mid trap  -TL      Manual Rx 3 Grade opposing thumbs  -TL         Manual Rx 4    Manual Rx 4 Location SCM  -TL       Manual Rx 4 Grade pincer  -TL         Manual Rx 5    Manual Rx 5 Location scalenes  -TL      Manual Rx 5 Grade pressure  -TL         Manual Rx 6    Manual Rx 6 Location upper trap S manual supine  -TL      Manual Rx 6 Grade 2/30 sec hold  -TL         Manual Rx 7    Manual Rx 7 Location levator S manually supine  -TL      Manual Rx 7 Duration x2/30  -TL            User Key  (r) = Recorded By, (t) = Taken By, (c) = Cosigned By    Initials Name Provider Type    Desiree Lopes PTA Physical Therapist Assistant                 PT OP Goals     Row Name 03/22/23 1400          PT Short Term Goals    STG Date to Achieve 04/03/23  -TL     STG 1 Note  >/= 50% subjective improvement.  -TL     STG 1 Progress Met  -TL     STG 2 Negative BPPV testing.  -TL     STG 2 Progress Not Met  -TL        Long Term Goals    LTG Date to Achieve 05/15/23  -TL     LTG 1 Independent with HEP/self-management.  -TL     LTG 1 Progress Ongoing;Progressing  -TL     LTG 2 Resolution of daily headaches.  -TL     LTG 2 Progress Progressing  -TL     LTG 3 Resume PLOF.  -TL     LTG 3 Progress Ongoing;Progressing  -TL        Time Calculation    PT Goal Re-Cert Due Date 04/03/23  -TL           User Key  (r) = Recorded By, (t) = Taken By, (c) = Cosigned By    Initials Name Provider Type    Desiree Lopes PTA Physical Therapist Assistant                Therapy Education  Education Details: shld rows mid/high, lat pull downs GTB  Given: HEP  Program: New, Reinforced  How Provided: Verbal, Demonstration, Written  Provided to: Patient  Level of Understanding: Verbalized, Demonstrated              Time Calculation:   Start Time: 1350  Stop Time: 1447  Time Calculation (min): 57 min  PT Non-Billable Time (min): 10 min  Therapy Charges for Today     Code Description Service Date Service Provider Modifiers Qty    86880175156 HC PT THER PROC EA 15 MIN 3/22/2023 Desiree Carroll PTA GP, CQ 2    27873512430 HC PT MANUAL THERAPY EA 15 MIN 3/22/2023  [place], Desiree MARKHAM, PTA GP, CQ 1                    Desiree Carroll, DARRYL  3/22/2023

## 2023-03-27 ENCOUNTER — HOSPITAL ENCOUNTER (OUTPATIENT)
Dept: PHYSICAL THERAPY | Facility: HOSPITAL | Age: 23
Setting detail: THERAPIES SERIES
Discharge: HOME | End: 2023-03-27
Payer: COMMERCIAL

## 2023-03-27 DIAGNOSIS — R42 DIZZINESS: Primary | ICD-10-CM

## 2023-03-27 DIAGNOSIS — G43.009 MIGRAINE WITHOUT AURA AND WITHOUT STATUS MIGRAINOSUS, NOT INTRACTABLE: ICD-10-CM

## 2023-03-27 PROCEDURE — 97110 THERAPEUTIC EXERCISES: CPT

## 2023-03-27 RX ORDER — TOPIRAMATE 25 MG/1
25 TABLET ORAL 2 TIMES DAILY
Qty: 30 TABLET | Refills: 2 | Status: SHIPPED | OUTPATIENT
Start: 2023-03-27

## 2023-03-27 NOTE — THERAPY TREATMENT NOTE
Outpatient Physical Therapy Ortho Treatment Note  [facility]     Patient Name: Michela Kovacs  : 2000  MRN: [id number]  Today's Date: 3/27/2023      Visit Date: 2023   Attendance:   Subjective improvement: 90%  Recert: 4/3/23  MD Appointment: TBD      Visit Dx:    ICD-10-CM ICD-9-CM   1. Dizziness  R42 780.4       Patient Active Problem List   Diagnosis   • Palpitations   • SOB (shortness of breath)   • Viral respiratory illness   • Gastroesophageal reflux disease        Past Medical History:   Diagnosis Date   • Allergic    • Constipation    • GERD (gastroesophageal reflux disease)    • Headache    • Heart murmur     9 months   • Ingrown toenail    • Migraine 2022   • Strep throat    • Urinary tract infection 2020        Past Surgical History:   Procedure Laterality Date   • AVULSION TOENAIL PLATE     • ENDOSCOPY N/A 2021    Procedure: ESOPHAGOGASTRODUODENOSCOPY;  Surgeon: Bambi Carmona MD;  Location: [facility] ENDOSCOPY;  Service: Gastroenterology;  Laterality: N/A;                        PT Assessment/Plan     Row Name 23 1400          PT Assessment    Assessment Comments Pt ana tx well with initiation of cybex equipment for scap strength. Chin tucks added this date with ball. Pt did have 1 episode of dizziness/HA but lasted <2 hrs  -EM        PT Plan    PT Frequency 2x/week  -EM     Predicted Duration of Therapy Intervention (PT) 3-4 weeks  -EM     PT Plan Comments Cont current POC, progress as ana.  -EM           User Key  (r) = Recorded By, (t) = Taken By, (c) = Cosigned By    Initials Name Provider Type    Earle Serrano PTA Physical Therapist Assistant                 Modalities     Row Name 23 1300             Subjective Pain    Post-Treatment Pain Level 2  -EM            User Key  (r) = Recorded By, (t) = Taken By, (c) = Cosigned By    Initials Name Provider Type    Earle Serrano PTA Physical Therapist Assistant           "     OP Exercises     Row Name 03/27/23 1300             Subjective Comments    Subjective Comments Pt had HA and slight dizziness on Saturday <2 hrs. Low intensity.  -EM         Subjective Pain    Able to rate subjective pain? yes  -EM      Pre-Treatment Pain Level 2  -EM      Post-Treatment Pain Level 2  -EM         Exercise 1    Exercise Name 1 Concept 2  -EM      Time 1 10'  -EM         Exercise 2    Exercise Name 2 Chin Tucks into ball  -EM      Sets 2 1  -EM      Reps 2 20  -EM      Time 2 5\" Hold  -EM         Exercise 3    Exercise Name 3 Cybex [place] Row  -EM      Sets 3 2  -EM      Reps 3 10  -EM      Additional Comments 60#  -EM         Exercise 4    Exercise Name 4 Cybex High Row  -EM      Sets 4 2  -EM      Reps 4 10  -EM      Additional Comments 60#  -EM         Exercise 5    Exercise Name 5 B Prone YTI with DB  -EM      Sets 5 2  -EM      Reps 5 10  -EM      Additional Comments 2# DB  -EM         Exercise 6    Exercise Name 6 Lawnmower on Cybex 360  -EM      Sets 6 1  -EM      Reps 6 20  -EM      Additional Comments 2 Plates  -EM         Exercise 7    Exercise Name 7 Wall Gough  -EM      Sets 7 1  -EM      Reps 7 20  -EM         Exercise 8    Exercise Name 8 D2 PNF flexion with DB  -EM      Sets 8 1  -EM      Reps 8 20  -EM      Additional Comments 3# DB  -EM            User Key  (r) = Recorded By, (t) = Taken By, (c) = Cosigned By    Initials Name Provider Type    EM Earle Mccloud, PTA Physical Therapist Assistant                              PT OP Goals     Row Name 03/27/23 1400          PT Short Term Goals    STG Date to Achieve 04/03/23  -EM     STG 1 Note  >/= 50% subjective improvement.  -EM     STG 1 Progress Met   -EM     STG 2 Negative BPPV testing.  -EM     STG 2 Progress Not Met  -EM        Long Term Goals    LTG Date to Achieve 05/15/23  -EM     LTG 1 Independent with HEP/self-management.  -EM     LTG 1 Progress Ongoing;Progressing  -EM     LTG 2 Resolution of daily headaches.  -EM     " LTG 2 Progress Met   -EM     LTG 3 Resume PLOF.  -EM     LTG 3 Progress Ongoing;Progressing  -EM        Time Calculation    PT Goal Re-Cert Due Date 04/03/23  -EM           User Key  (r) = Recorded By, (t) = Taken By, (c) = Cosigned By    Initials Name Provider Type    EM Earle Mccloud PTA Physical Therapist Assistant                               Time Calculation:   Start Time: 1348  Stop Time: 1430  Time Calculation (min): 42 min  Total Timed Code Minutes- PT: 42 minute(s)  Therapy Charges for Today     Code Description Service Date Service Provider Modifiers Qty    33268931145 HC PT THER PROC EA 15 MIN 3/27/2023 Earle Mccloud PTA GP, CQ 3                    Earle Mccloud PTA  3/27/2023

## 2023-03-29 ENCOUNTER — HOSPITAL ENCOUNTER (OUTPATIENT)
Dept: PHYSICAL THERAPY | Facility: HOSPITAL | Age: 23
Setting detail: THERAPIES SERIES
Discharge: HOME | End: 2023-03-29
Payer: COMMERCIAL

## 2023-03-29 DIAGNOSIS — R42 DIZZINESS: Primary | ICD-10-CM

## 2023-03-29 PROCEDURE — 97110 THERAPEUTIC EXERCISES: CPT | Performed by: PHYSICAL THERAPIST

## 2023-04-03 ENCOUNTER — HOSPITAL ENCOUNTER (OUTPATIENT)
Dept: PHYSICAL THERAPY | Facility: HOSPITAL | Age: 23
Setting detail: THERAPIES SERIES
Discharge: HOME | End: 2023-04-03
Payer: COMMERCIAL

## 2023-04-03 DIAGNOSIS — R42 DIZZINESS: Primary | ICD-10-CM

## 2023-04-03 PROCEDURE — 97110 THERAPEUTIC EXERCISES: CPT

## 2023-04-03 NOTE — THERAPY TREATMENT NOTE
Outpatient Physical Therapy Ortho Treatment Note  [facility]     Patient Name: Michela Kovacs  : 2000  MRN: [id number]  Today's Date: 4/3/2023      Visit Date: 2023   Attendance:   Subjective improvement: 90%  Recert: 4/3/23  MD Appointment: none      Visit Dx:    ICD-10-CM ICD-9-CM   1. Dizziness  R42 780.4       Patient Active Problem List   Diagnosis   • Palpitations   • SOB (shortness of breath)   • Viral respiratory illness   • Gastroesophageal reflux disease        Past Medical History:   Diagnosis Date   • Allergic    • Constipation    • GERD (gastroesophageal reflux disease)    • Headache    • Heart murmur     9 months   • Ingrown toenail    • Migraine 2022   • Strep throat    • Urinary tract infection 2020        Past Surgical History:   Procedure Laterality Date   • AVULSION TOENAIL PLATE     • ENDOSCOPY N/A 2021    Procedure: ESOPHAGOGASTRODUODENOSCOPY;  Surgeon: Bambi Carmona MD;  Location: [facility] ENDOSCOPY;  Service: Gastroenterology;  Laterality: N/A;                        PT Assessment/Plan     Row Name 23 1300          PT Assessment    Functional Limitations Limitation in home management;Limitations in community activities;Limitations in functional capacity and performance;Performance in leisure activities;Performance in self-care ADL;Performance in work activities  -EM     Impairments Pain;Joint mobility  -EM     Assessment Comments Pt ana tx well with progressed therex regime focused on vestibular training. Slight reactivity noted and nystagmus noted with R head turn with eyes fixed. Updated HEP issued with good understanding.  -EM     Rehab Potential Good  -EM     Patient/caregiver participated in establishment of treatment plan and goals Yes  -EM     Patient would benefit from skilled therapy intervention Yes  -EM        PT Plan    PT Frequency 2x/week  -EM     Predicted Duration of Therapy Intervention (PT) 3-4 weeks  -EM  "    PT Plan Comments Recert next. Cont to progress as ana.  -EM           User Key  (r) = Recorded By, (t) = Taken By, (c) = Cosigned By    Initials Name Provider Type    Earle Serrano PTA Physical Therapist Assistant                   OP Exercises     Row Name 04/03/23 1300             Subjective Comments    Subjective Comments Pt reports she had a brief HA today which quickly resolved with medication.  -EM         Subjective Pain    Able to rate subjective pain? yes  -EM      Pre-Treatment Pain Level 0  -EM      Post-Treatment Pain Level 0  -EM         Exercise 1    Exercise Name 1 Concept 2  -EM      Time 1 10'  -EM         Exercise 2    Exercise Name 2 Viramontes & Erica exercises  -EM      Sets 2 1  -EM      Reps 2 5 Cycles  -EM         Exercise 3    Exercise Name 3 Slow head nods with eyes closed  -EM      Sets 3 2  -EM      Reps 3 10  -EM         Exercise 4    Exercise Name 4 Eyes Fixed Horizontal Head turns  -EM      Sets 4 1  -EM      Reps 4 20  -EM      Additional Comments Shift in eye with R head turn \"pressure dizzy sensation between eyes\"  -EM         Exercise 5    Exercise Name 5 Eyes Fixed Vertical head movement  -EM      Sets 5 1  -EM      Reps 5 20  -EM         Exercise 6    Exercise Name 6 Eyes Fixed Head Circles: CW, CCW  -EM      Sets 6 1  -EM      Reps 6 20  -EM      Additional Comments slight dizziness  -EM         Exercise 7    Exercise Name 7 St Toe Touches with eyes closed  -EM      Sets 7 1  -EM      Reps 7 10  -EM      Additional Comments Sligh dizziness  -EM            User Key  (r) = Recorded By, (t) = Taken By, (c) = Cosigned By    Initials Name Provider Type    Earle Serrano PTA Physical Therapist Assistant                                 Therapy Education  Education Details: Wander lepe and Cawthorne Cooksey issued for HEP  Given: HEP  Program: Progressed  How Provided: Demonstration, Verbal, Written  Provided to: Patient  Level of Understanding: Verbalized, Demonstrated     "          Time Calculation:   Start Time: 1345  Stop Time: 1428  Time Calculation (min): 43 min  Total Timed Code Minutes- PT: 43 minute(s)  Therapy Charges for Today     Code Description Service Date Service Provider Modifiers Qty    91973919876 HC PT THER PROC EA 15 MIN 4/3/2023 Earle Mccloud, DARRYL GP, CQ 3                    Earle Mccloud PTA  4/3/2023

## 2023-04-05 ENCOUNTER — HOSPITAL ENCOUNTER (OUTPATIENT)
Dept: PHYSICAL THERAPY | Facility: HOSPITAL | Age: 23
Setting detail: THERAPIES SERIES
Discharge: HOME | End: 2023-04-05
Payer: COMMERCIAL

## 2023-04-05 DIAGNOSIS — R42 DIZZINESS: Primary | ICD-10-CM

## 2023-04-05 PROCEDURE — 97110 THERAPEUTIC EXERCISES: CPT

## 2023-04-05 NOTE — THERAPY TREATMENT NOTE
Outpatient Physical Therapy Ortho Treatment Note  [facility]     Patient Name: Michela Kovacs  : 2000  MRN: [id number]  Today's Date: 2023      Visit Date: 2023   Attendance:   Subjective improvement:90%  Recert: 4/3/23  MD Appointment: n/a      Visit Dx:    ICD-10-CM ICD-9-CM   1. Dizziness  R42 780.4       Patient Active Problem List   Diagnosis   • Palpitations   • SOB (shortness of breath)   • Viral respiratory illness   • Gastroesophageal reflux disease        Past Medical History:   Diagnosis Date   • Allergic    • Constipation    • GERD (gastroesophageal reflux disease)    • Headache    • Heart murmur     9 months   • Ingrown toenail    • Migraine 2022   • Strep throat    • Urinary tract infection 2020        Past Surgical History:   Procedure Laterality Date   • AVULSION TOENAIL PLATE     • ENDOSCOPY N/A 2021    Procedure: ESOPHAGOGASTRODUODENOSCOPY;  Surgeon: Bambi Carmona MD;  Location: [facility] ENDOSCOPY;  Service: Gastroenterology;  Laterality: N/A;                        PT Assessment/Plan     Row Name 23 1300          PT Assessment    Functional Limitations Limitation in home management;Limitations in community activities;Limitations in functional capacity and performance;Performance in leisure activities;Performance in self-care ADL;Performance in work activities  -EM     Impairments Pain;Joint mobility  -EM     Assessment Comments Pt ana tx well. Reduced HA freq and intensity, howver HA and slight dizziness still occur.  -EM     Rehab Potential Good  -EM     Patient/caregiver participated in establishment of treatment plan and goals Yes  -EM     Patient would benefit from skilled therapy intervention Yes  -EM        PT Plan    PT Frequency 2x/week  -EM     Predicted Duration of Therapy Intervention (PT) 3-4 weeks  -EM     PT Plan Comments Recert soon. Con scap strengthening and vestibular activities as indicated.  -EM         "   User Key  (r) = Recorded By, (t) = Taken By, (c) = Cosigned By    Initials Name Provider Type    EM Earle Mccloud, DARRYL Physical Therapist Assistant                 Modalities     Row Name 04/05/23 1300             Subjective Pain    Post-Treatment Pain Level 3  -EM            User Key  (r) = Recorded By, (t) = Taken By, (c) = Cosigned By    Initials Name Provider Type    EM Earle Mccloud, DARRYL Physical Therapist Assistant               OP Exercises     Row Name 04/05/23 1300             Subjective Comments    Subjective Comments Pt reports a mild HA this date  -EM         Subjective Pain    Able to rate subjective pain? yes  -EM      Pre-Treatment Pain Level 3  -EM      Post-Treatment Pain Level 3  -EM         Exercise 1    Exercise Name 1 Concept 2  -EM      Time 1 10'  -EM         Exercise 2    Exercise Name 2 Cybex Divide Row  -EM      Sets 2 1  -EM      Reps 2 30  -EM      Additional Comments 50#  -EM         Exercise 3    Exercise Name 3 Cybex High Row  -EM      Sets 3 1  -EM      Reps 3 30  -EM      Additional Comments 50#  -EM         Exercise 4    Exercise Name 4 Cybex Divide Fly  -EM      Sets 4 1  -EM      Reps 4 30  -EM      Additional Comments 40#  -EM         Exercise 5    Exercise Name 5 Eyes Fixed Vertical head movement  -EM      Sets 5 1  -EM      Reps 5 20  -EM         Exercise 6    Exercise Name 6 Eyes Fixed Horizontal Head Turns  -EM      Sets 6 1  -EM      Reps 6 20  -EM         Exercise 7    Exercise Name 7 Head Fixed Eye movement: Horizontal/Vertical  -EM      Sets 7 1  -EM      Reps 7 20  -EM      Additional Comments Increased pressure between eyes with R eye movement  -EM         Exercise 8    Exercise Name 8 Prone Chin Tuck  -EM      Sets 8 1  -EM      Reps 8 20  -EM      Time 8 5\" Holds  -EM         Exercise 9    Exercise Name 9 Viramontes & Daroff  -EM      Sets 9 1  -EM      Reps 9 5 Cycles  -EM            User Key  (r) = Recorded By, (t) = Taken By, (c) = Cosigned By    Initials Name " Provider Type    Earle Serrano PTA Physical Therapist Assistant                              PT OP Goals     Row Name 04/05/23 1300          PT Short Term Goals    STG Date to Achieve 04/03/23  -EM     STG 1 Note  >/= 50% subjective improvement.  -EM     STG 1 Progress Met   -EM     STG 2 Negative BPPV testing.  -EM     STG 2 Progress Not Met  -EM        Long Term Goals    LTG Date to Achieve 05/15/23  -EM     LTG 1 Independent with HEP/self-management.  -EM     LTG 1 Progress Met;Ongoing  -EM     LTG 2 Resolution of daily headaches.  -EM     LTG 2 Progress Met   -EM     LTG 3 Resume PLOF.  -EM     LTG 3 Progress Ongoing;Progressing  -EM           User Key  (r) = Recorded By, (t) = Taken By, (c) = Cosigned By    Initials Name Provider Type    EM Earle Mccloud PTA Physical Therapist Assistant                               Time Calculation:   Start Time: 1303  Stop Time: 1348  Time Calculation (min): 45 min  Total Timed Code Minutes- PT: 45 minute(s)  Therapy Charges for Today     Code Description Service Date Service Provider Modifiers Qty    89132277009 HC PT THER PROC EA 15 MIN 4/5/2023 Earle Mccloud PTA GP, CQ 3                    Earle Mccloud PTA  4/5/2023

## 2023-04-10 ENCOUNTER — HOSPITAL ENCOUNTER (OUTPATIENT)
Dept: PHYSICAL THERAPY | Facility: HOSPITAL | Age: 23
Setting detail: THERAPIES SERIES
Discharge: HOME | End: 2023-04-10
Payer: COMMERCIAL

## 2023-04-10 DIAGNOSIS — R42 DIZZINESS: Primary | ICD-10-CM

## 2023-04-10 PROCEDURE — 97140 MANUAL THERAPY 1/> REGIONS: CPT

## 2023-04-10 NOTE — THERAPY TREATMENT NOTE
Outpatient Physical Therapy Ortho Treatment Note  [facility]     Patient Name: Michela Kovacs  : 2000  MRN: [id number]  Today's Date: 4/10/2023      Visit Date: 04/10/2023   Attendance:   Subjective improvement: 90%  Recert: 4/3/23  MD Appointment: n/a      Visit Dx:    ICD-10-CM ICD-9-CM   1. Dizziness  R42 780.4       Patient Active Problem List   Diagnosis   • Palpitations   • SOB (shortness of breath)   • Viral respiratory illness   • Gastroesophageal reflux disease        Past Medical History:   Diagnosis Date   • Allergic    • Constipation    • GERD (gastroesophageal reflux disease)    • Headache    • Heart murmur     9 months   • Ingrown toenail    • Migraine 2022   • Strep throat    • Urinary tract infection 2020        Past Surgical History:   Procedure Laterality Date   • AVULSION TOENAIL PLATE     • ENDOSCOPY N/A 2021    Procedure: ESOPHAGOGASTRODUODENOSCOPY;  Surgeon: Bambi Carmona MD;  Location: [facility] ENDOSCOPY;  Service: Gastroenterology;  Laterality: N/A;                        PT Assessment/Plan     Row Name 04/10/23 1600          PT Assessment    Functional Limitations Limitation in home management;Limitations in community activities;Limitations in functional capacity and performance;Performance in leisure activities;Performance in self-care ADL;Performance in work activities  -EM     Impairments Pain;Joint mobility  -EM     Assessment Comments Pt ana tx well. Pt had multiple trigger points B SCM, scalenses, UT, LS which all improved post manual. No goals met this tx. Tx limited due to late arrival.  -EM     Rehab Potential Good  -EM     Patient/caregiver participated in establishment of treatment plan and goals Yes  -EM     Patient would benefit from skilled therapy intervention Yes  -EM        PT Plan    PT Frequency 2x/week  -EM     Predicted Duration of Therapy Intervention (PT) 3-4 weeks  -EM     PT Plan Comments Recert next visit.  Cont manual to C-Spine.  -EM           User Key  (r) = Recorded By, (t) = Taken By, (c) = Cosigned By    Initials Name Provider Type    EM Earle Mccloud PTA Physical Therapist Assistant                   OP Exercises     Row Name 04/10/23 1500             Subjective Comments    Subjective Comments Pt is having 1x/wk. No thaving ot take meds anymore.  -EM         Subjective Pain    Able to rate subjective pain? yes  -EM      Pre-Treatment Pain Level 1  -EM      Post-Treatment Pain Level 3  -EM         Exercise 1    Exercise Name 1 Manual  -EM      Time 1 24'  -EM         Exercise 2    Exercise Name 2 Prone YTI with DB  -EM      Sets 2 1  -EM      Reps 2 30  -EM      Additional Comments 3#  -EM         Exercise 3    Exercise Name 3 Sup Chin Tucks  -EM      Sets 3 1  -EM      Reps 3 20  -EM            User Key  (r) = Recorded By, (t) = Taken By, (c) = Cosigned By    Initials Name Provider Type    Earle Serrano PTA Physical Therapist Assistant                         Manual Rx (last 36 hours)     Manual Treatments     Row Name 04/10/23 1500             Manual Rx 1    Manual Rx 1 Location B UT, B LS, Scalenes, SCM  -EM      Manual Rx 1 Type STM  -EM      Manual Rx 1 Duration 24'  -EM            User Key  (r) = Recorded By, (t) = Taken By, (c) = Cosigned By    Initials Name Provider Type    EM Earle Mccloud, DARRYL Physical Therapist Assistant                 PT OP Goals     Row Name 04/10/23 1500          PT Short Term Goals    STG Date to Achieve 04/03/23  -EM     STG 1 Note  >/= 50% subjective improvement.  -EM     STG 1 Progress Met  -EM     STG 2 Negative BPPV testing.  -EM     STG 2 Progress Not Met  -EM        Long Term Goals    LTG Date to Achieve 05/15/23  -EM     LTG 1 Independent with HEP/self-management.  -EM     LTG 1 Progress Met;Ongoing  -EM     LTG 2 Resolution of daily headaches.  -EM     LTG 2 Progress Met  -EM     LTG 3 Resume PLOF.  -EM     LTG 3 Progress Ongoing;Progressing  -EM           User  Key  (r) = Recorded By, (t) = Taken By, (c) = Cosigned By    Initials Name Provider Type    EM Earle Mccloud PTA Physical Therapist Assistant                               Time Calculation:   Start Time: 1440  Stop Time: 1512  Time Calculation (min): 32 min  Total Timed Code Minutes- PT: 32 minute(s)  Therapy Charges for Today     Code Description Service Date Service Provider Modifiers Qty    36532453420 HC PT MANUAL THERAPY EA 15 MIN 4/10/2023 Earle Mccloud PTA GP, CQ 2                    Earle Mccloud PTA  4/10/2023

## 2023-04-12 ENCOUNTER — HOSPITAL ENCOUNTER (OUTPATIENT)
Dept: PHYSICAL THERAPY | Facility: HOSPITAL | Age: 23
Setting detail: THERAPIES SERIES
Discharge: HOME | End: 2023-04-12
Payer: COMMERCIAL

## 2023-04-12 ENCOUNTER — OFFICE VISIT (OUTPATIENT)
Dept: OBSTETRICS AND GYNECOLOGY | Facility: CLINIC | Age: 23
End: 2023-04-12
Payer: COMMERCIAL

## 2023-04-12 VITALS
HEIGHT: 62 IN | BODY MASS INDEX: 26.31 KG/M2 | SYSTOLIC BLOOD PRESSURE: 108 MMHG | DIASTOLIC BLOOD PRESSURE: 66 MMHG | WEIGHT: 143 LBS

## 2023-04-12 DIAGNOSIS — R42 DIZZINESS: Primary | ICD-10-CM

## 2023-04-12 DIAGNOSIS — Z30.431 IUD CHECK UP: Primary | ICD-10-CM

## 2023-04-12 DIAGNOSIS — N93.9 COMPLAINT OF VAGINAL BLEEDING: ICD-10-CM

## 2023-04-12 PROCEDURE — 97110 THERAPEUTIC EXERCISES: CPT

## 2023-04-12 NOTE — THERAPY DISCHARGE NOTE
Outpatient Physical Therapy Ortho Treatment Note/Discharge Summary  [facility]     Patient Name: Michela Kovacs  : 2000  MRN: [id number]  Today's Date: 2023      Visit Date: 2023     Attendance: 10/10 of 90  Subjective improvement: 95%  Recert: N/A  MD Appointment: n/a    Visit Dx:    ICD-10-CM ICD-9-CM   1. Dizziness  R42 780.4       Patient Active Problem List   Diagnosis   • Palpitations   • SOB (shortness of breath)   • Viral respiratory illness   • Gastroesophageal reflux disease        Past Medical History:   Diagnosis Date   • Allergic    • Constipation    • GERD (gastroesophageal reflux disease)    • Headache    • Heart murmur     9 months   • Ingrown toenail    • Migraine 2022   • Strep throat    • Urinary tract infection 2020        Past Surgical History:   Procedure Laterality Date   • AVULSION TOENAIL PLATE     • ENDOSCOPY N/A 2021    Procedure: ESOPHAGOGASTRODUODENOSCOPY;  Surgeon: Bambi Carmona MD;  Location: [facility] ENDOSCOPY;  Service: Gastroenterology;  Laterality: N/A;        PT Ortho     Row Name 23 1515       Subjective Comments    Subjective Comments Pt reports 95% improvement since SOC. She will occasionally have short episodes of dizziness with certain movements, specifically turning her head quickly while driving, but this is much better than previously. The frequency and intensity of her headaches has improved significantly.  -ND       Subjective Pain    Able to rate subjective pain? --  -ND    Pre-Treatment Pain Level --  -ND    Post-Treatment Pain Level --  -ND          User Key  (r) = Recorded By, (t) = Taken By, (c) = Cosigned By    Initials Name Provider Type    ND Aamir Marks, PT Physical Therapist                       Vestibular Eval     Row Name 23 1515             Pain Assessment    Pain Score 1  headache  -ND         Occulomotor Exam Fixation Present    Occular ROM Normal  -ND      Spontaneous Nystagmus  Absent  -ND      Gaze-induced Nystagmus Absent  -ND      Head Shaking Horizontal Other  c/o slight dizziness to the left, eyes open > closed  -ND      Head Shaking Vertical Absent  -ND      Smooth Pursuit Normal  -ND      Convergence Normal  -ND         Positional Testing    Hussein-Hallpike Right No nystagmus  -ND      Hussein-Hallpike Left No nystagmus  -ND      Horizontal Roll Test Right No nystagmus  -ND      Horizontal Roll Test Left No nystagmus  -ND            User Key  (r) = Recorded By, (t) = Taken By, (c) = Cosigned By    Initials Name Provider Type    Aamir Zarate, PT Physical Therapist                   PT Assessment/Plan     Row Name 04/12/23 1512          PT Assessment    Assessment Comments Pt has made exceptional progress through 10 visits with PT meeting all goals established at initial evaluation. Pt reports 95% improvement in headache and dizziness symptoms. She demonstrates understanding of HEP and is in agreement to discharge to independent management at this time.  -ND        PT Plan    PT Plan Comments Discharge to independent management.  -ND           User Key  (r) = Recorded By, (t) = Taken By, (c) = Cosigned By    Initials Name Provider Type    Aamir Zarate, PT Physical Therapist                     OP Exercises     Row Name 04/12/23 1519             Subjective Comments    Subjective Comments Pt reports 95% improvement since SOC. She will occasionally have short episodes of dizziness with certain movements, specifically turning her head quickly while driving, but this is much better than previously. The frequency and intensity of her headaches has improved significantly.  -ND         Subjective Pain    Able to rate subjective pain? --  -ND      Pre-Treatment Pain Level --  -ND      Post-Treatment Pain Level --  -ND         Total Minutes    72635 - PT Therapeutic Exercise Minutes 27  -ND         Exercise 1    Exercise Name 1 Reassessment/HEP  -ND            User Key  (r) = Recorded By, (t) =  Taken By, (c) = Cosigned By    Initials Name Provider Type    Aamir Zarate, PT Physical Therapist                                PT OP Goals     Row Name 04/12/23 1515          PT Short Term Goals    STG Date to Achieve 04/03/23  -ND     STG 1 Note  >/= 50% subjective improvement.  -ND     STG 1 Progress Met   -ND     STG 1 Progress Comments 95%  -ND     STG 2 Negative BPPV testing.  -ND     STG 2 Progress Met   -ND        Long Term Goals    LTG Date to Achieve 05/15/23  -ND     LTG 1 Independent with HEP/self-management.  -ND     LTG 1 Progress Met   -ND     LTG 2 Resolution of daily headaches.  -ND     LTG 2 Progress Met   -ND     LTG 3 Resume PLOF.  -ND     LTG 3 Progress Met   -ND        Time Calculation    PT Goal Re-Cert Due Date --  Discharge  -ND           User Key  (r) = Recorded By, (t) = Taken By, (c) = Cosigned By    Initials Name Provider Type    Aamir Zarate, PT Physical Therapist                Therapy Education  Education Details: Discharge prognosis, addition of tennis ball SOR, headache SNAG, and cervical rotation SNAG to HEP.  Given: HEP, Symptoms/condition management  Program: New, Progressed  How Provided: Verbal, Demonstration, Written  Provided to: Patient  Level of Understanding: Verbalized, Demonstrated              Time Calculation:   Start Time: 1515  Stop Time: 1542  Time Calculation (min): 27 min  Total Timed Code Minutes- PT: 27 minute(s)  Timed Charges  07808 - PT Therapeutic Exercise Minutes: 27  Total Minutes  Timed Charges Total Minutes: 27   Total Minutes: 27  Therapy Charges for Today     Code Description Service Date Service Provider Modifiers Qty    [id number] HC PT THER PROC EA 15 MIN 4/12/2023 Aamir Marks, PT GP 2                       Aamir Marks PT  4/12/2023

## 2023-04-12 NOTE — PROGRESS NOTES
IUD Removal    Date of procedure:  4/12/2023    Risks and benefits discussed? yes  All questions answered? yes  Consents given by The patient  Written consent obtained? yes  Reason for removal: Side effect: vaginal bleeding, IUD descending       Procedure documentation:    A speculum was placed in order to view the cervix.  The cervix did not need to be grasped.  Cervical dilation did not need to be performed in order to access the string.  The IUD string was easily seen.  The string was grasped and the IUD was removed without difficulty.  The IUD did not appear to be adherent to the uterine cavity. It was removed intact.    She tolerated the procedure without any difficulty.     Post procedure instructions: Patient notified to call with heavy bleeding, fever or increasing pain.      Follow up needed: Annual gynecological exam in 1 year or sooner if needed    Diagnoses and all orders for this visit:    1. IUD check up (Primary)  -     US Non-ob Transvaginal; Future    2. Complaint of vaginal bleeding  -     US Non-ob Transvaginal; Future        This note was electronically signed.    KAREN Rueda  April 12, 2023

## 2023-04-12 NOTE — PROGRESS NOTES
Subjective   Michela Kovacs is a 22 y.o. IUD check up      History of Present Illness  Pt presents for routine follow up regarding ParaGard IUD.  She has been experiencing some cramping and light vaginal bleeding for the past few weeks.  The vaginal bleeding is spotting to light in flow.  Her partner did experience some discomfort during an intercourse episode.  She desires to keep IUD at this time.      Gynecologic Exam  The patient's primary symptoms include vaginal bleeding. The patient's pertinent negatives include no genital itching, genital lesions, genital odor, genital rash, missed menses, pelvic pain or vaginal discharge. This is a new problem. The current episode started 1 to 4 weeks ago. The problem occurs daily. The problem has been unchanged. She is not pregnant. Pertinent negatives include no abdominal pain, anorexia, back pain, chills, constipation, diarrhea, discolored urine, dysuria, fever, flank pain, frequency, headaches, hematuria, joint pain, joint swelling, nausea, painful intercourse, rash, sore throat, urgency or vomiting. The vaginal bleeding is lighter than menses. She has not been passing clots. She has not been passing tissue. Nothing aggravates the symptoms. She has tried nothing for the symptoms. She uses an IUD for contraception. Her menstrual history has been regular.       The following portions of the patient's history were reviewed and updated as appropriate: allergies, current medications, past family history, past medical history, past social history, past surgical history and problem list.    Review of Systems   Constitutional: Negative for chills and fever.   HENT: Negative for sore throat.    Gastrointestinal: Negative for abdominal pain, anorexia, constipation, diarrhea, nausea and vomiting.   Genitourinary: Positive for vaginal bleeding. Negative for decreased urine volume, difficulty urinating, dyspareunia, dysuria, enuresis, flank pain, frequency, genital sores,  hematuria, missed menses, pelvic pain, urgency, vaginal discharge and vaginal pain.   Musculoskeletal: Negative for back pain and joint pain.   Skin: Negative for rash.   Neurological: Negative for headaches.         Objective   Physical Exam  Exam conducted with a chaperone present.   Genitourinary:     General: Normal vulva.      Exam position: Lithotomy position.      Labia:         Right: No rash, tenderness, lesion or injury.         Left: No rash, tenderness, lesion or injury.       Urethra: No prolapse, urethral pain, urethral swelling or urethral lesion.      Vagina: Normal.      Cervix: Normal.      Uterus: Normal.       Adnexa:         Right: No mass, tenderness or fullness.          Left: No mass, tenderness or fullness.        Comments: IUD strings approximate 4 cm in length          Assessment & Plan   Diagnoses and all orders for this visit:    1. IUD check up (Primary)  -     US Non-ob Transvaginal; Future    2. Complaint of vaginal bleeding  -     US Non-ob Transvaginal; Future      Recommended TVUS to assess IUD positioning, pt agreeable.      Reviewed preliminary TVUS- uterus retroverted measuring 7.17x 2.83x 4.17 cm with total volume 44.30 cc, IUD 9 mm from fundus of uterus within endometrium- 7.9mm, right ovary measuring 3.94x 2.34x 2.43 cm with total volume 11.73 cc, left ovary 2.16x 2.02x 2.70 cm with total volume 6.17cc, some free fluid in cul de sac      Recommended IUD removal due to bleeding and IUD starting to descend into DEJAH    Pt unsure of desired contraception.  She may re-start oral contraception since she has a pack at home. I would be glad to refill oral contraception or assist with different hormonal contraception.

## 2023-04-13 ENCOUNTER — APPOINTMENT (OUTPATIENT)
Dept: PHYSICAL THERAPY | Facility: HOSPITAL | Age: 23
End: 2023-04-13
Payer: COMMERCIAL

## 2023-04-13 ENCOUNTER — TELEPHONE (OUTPATIENT)
Dept: OBSTETRICS AND GYNECOLOGY | Facility: CLINIC | Age: 23
End: 2023-04-13
Payer: COMMERCIAL

## 2023-04-13 NOTE — TELEPHONE ENCOUNTER
Attempted to call the pt regarding U/S results.  There was no answer so I left a msg on pt's voicemail for her to return my call at her earliest convenience. Yodit Rick pt.

## 2023-04-17 ENCOUNTER — APPOINTMENT (OUTPATIENT)
Dept: PHYSICAL THERAPY | Facility: HOSPITAL | Age: 23
End: 2023-04-17
Payer: COMMERCIAL

## 2023-04-19 ENCOUNTER — APPOINTMENT (OUTPATIENT)
Dept: PHYSICAL THERAPY | Facility: HOSPITAL | Age: 23
End: 2023-04-19
Payer: COMMERCIAL

## 2023-04-19 RX ORDER — NORGESTIMATE AND ETHINYL ESTRADIOL 7DAYSX3 LO
1 KIT ORAL DAILY
Qty: 84 TABLET | Refills: 4 | Status: SHIPPED | OUTPATIENT
Start: 2023-04-19 | End: 2024-04-18

## 2023-06-09 DIAGNOSIS — F33.0 MILD EPISODE OF RECURRENT MAJOR DEPRESSIVE DISORDER: ICD-10-CM

## 2023-08-07 DIAGNOSIS — G43.009 MIGRAINE WITHOUT AURA AND WITHOUT STATUS MIGRAINOSUS, NOT INTRACTABLE: ICD-10-CM

## 2023-08-07 RX ORDER — RIZATRIPTAN BENZOATE 10 MG/1
10 TABLET ORAL ONCE AS NEEDED
Qty: 9 TABLET | Refills: 2 | Status: SHIPPED | OUTPATIENT
Start: 2023-08-07

## 2023-09-27 ENCOUNTER — OFFICE VISIT (OUTPATIENT)
Dept: FAMILY MEDICINE CLINIC | Facility: CLINIC | Age: 23
End: 2023-09-27
Payer: COMMERCIAL

## 2023-09-27 ENCOUNTER — LAB (OUTPATIENT)
Dept: LAB | Facility: HOSPITAL | Age: 23
End: 2023-09-27
Payer: COMMERCIAL

## 2023-09-27 VITALS
HEIGHT: 62 IN | WEIGHT: 133.3 LBS | SYSTOLIC BLOOD PRESSURE: 114 MMHG | BODY MASS INDEX: 24.53 KG/M2 | HEART RATE: 104 BPM | OXYGEN SATURATION: 98 % | DIASTOLIC BLOOD PRESSURE: 72 MMHG

## 2023-09-27 DIAGNOSIS — G43.011 INTRACTABLE MIGRAINE WITHOUT AURA AND WITH STATUS MIGRAINOSUS: Primary | ICD-10-CM

## 2023-09-27 DIAGNOSIS — G43.011 INTRACTABLE MIGRAINE WITHOUT AURA AND WITH STATUS MIGRAINOSUS: ICD-10-CM

## 2023-09-27 DIAGNOSIS — R42 DIZZINESS: ICD-10-CM

## 2023-09-27 PROCEDURE — 36415 COLL VENOUS BLD VENIPUNCTURE: CPT

## 2023-09-27 PROCEDURE — 85025 COMPLETE CBC W/AUTO DIFF WBC: CPT

## 2023-09-27 PROCEDURE — 80053 COMPREHEN METABOLIC PANEL: CPT

## 2023-09-27 PROCEDURE — 99213 OFFICE O/P EST LOW 20 MIN: CPT | Performed by: NURSE PRACTITIONER

## 2023-09-27 PROCEDURE — 82607 VITAMIN B-12: CPT

## 2023-09-27 NOTE — PROGRESS NOTES
"Chief Complaint  Dizziness and Migraine    Subjective        Michela Kovacs presents to [facility] [place]  Has chronic migraine history and has been on Maxalt with relatively good control until the last several months.  Over the last several months number of headache days of increased but over the last two weeks she reports she has had a headache almost every day.  \"They are almost debilitating.\"  Has tried Topamax and Nurtec in the past with no relief of symptoms.  Has done vestibular therapy in the past which did provide moderate relief of symptoms.    Dizziness  This is a recurrent problem. The current episode started more than 1 month ago. The problem occurs every several days. The problem has been gradually worsening. Associated symptoms include nausea and vertigo. Nothing aggravates the symptoms. Treatments tried: Maxalt, Nurtec, Topamax.   Migraine  Headache pattern:  Headache sometimes there, sometimes not at all  Initial event:  None  Recent changes:  Headaches come more often than they used to and headaches last longer than they used to  Frequency:  Daily  ADL impact frequency:  1 per week  Time of day symptoms are worse:  No specific time of day  Do headaches wake patient from sleep?: No    Days of the week symptoms are worse:  No specific day of the week  Season symptoms are worse:  No particular season  Quality:  Dull and squeezing  Laterality:  Both sides at the same time  Location:  All over  Aggravating factors:  Dehydration, stress, noise and light  Abortive medications tried:  Acetaminophen and Excedrin migraine/tension    Current Outpatient Medications on File Prior to Visit   Medication Sig Dispense Refill    cetirizine (zyrTEC) 10 MG tablet Take 1 tablet by mouth Daily.      norgestimate-ethinyl estradiol (Ortho Tri-Cyclen Lo) 0.18/0.215/0.25 MG-25 MCG per tablet Take 1 tablet by mouth Daily. 84 tablet 4    ondansetron ODT (ZOFRAN-ODT) 4 " "MG disintegrating tablet Place 1 tablet on the tongue Every 8 (Eight) Hours As Needed for Nausea or Vomiting. 30 tablet 1    sertraline (Zoloft) 50 MG tablet Take 1 tablet by mouth Daily. 90 tablet 1    [DISCONTINUED] rizatriptan (Maxalt) 10 MG tablet Take 1 tablet by mouth 1 (One) Time As Needed for Migraine for up to 1 dose. May repeat in 2 hours if needed 9 tablet 2     No current facility-administered medications on file prior to visit.     Allergies   Allergen Reactions    Penicillins Hives       Objective   Vital Signs:  /72   Pulse 104   Ht 157.5 cm (62\")   Wt 60.5 kg (133 lb 4.8 oz)   SpO2 98%   BMI 24.38 kg/m²   Estimated body mass index is 24.38 kg/m² as calculated from the following:    Height as of this encounter: 157.5 cm (62\").    Weight as of this encounter: 60.5 kg (133 lb 4.8 oz).       BMI is within normal parameters. No other follow-up for BMI required.      Physical Exam  Vitals and nursing note reviewed.   Constitutional:       Appearance: Normal appearance. She is well-developed.   HENT:      Head: Normocephalic.   Eyes:      Pupils: Pupils are equal, round, and reactive to light.   Cardiovascular:      Rate and Rhythm: Normal rate and regular rhythm.      Heart sounds: Normal heart sounds.   Pulmonary:      Effort: Pulmonary effort is normal.      Breath sounds: Normal breath sounds.   Abdominal:      General: Bowel sounds are normal.      Palpations: Abdomen is soft.   Musculoskeletal:         General: Normal range of motion.      Cervical back: Normal range of motion and neck supple.   Skin:     General: Skin is warm.      Capillary Refill: Capillary refill takes less than 2 seconds.   Neurological:      Mental Status: She is alert and oriented to person, place, and time.      Motor: Motor function is intact.      Coordination: Coordination is intact.      Gait: Gait is intact.   Psychiatric:         Behavior: Behavior normal.      Result Review :                   Assessment and " Plan   Diagnoses and all orders for this visit:    1. Intractable migraine without aura and with status migrainosus (Primary)  -     CBC & Differential; Future  -     Comprehensive Metabolic Panel; Future  -     MRI Brain With & Without Contrast; Future    2. Dizziness  -     Vitamin B12; Future  -     Ambulatory Referral to Physical Therapy Vestibular      Intractable migraine without aura and with status migrainous:  Complete CBC and chemistry panel as ordered and will notify of results when available  Radiology will call to schedule MRI will notify of results when available  Ajovy Lot # [id number] Exp: 07/2025 X 1 box in office   Educated on possible side effects of this medication  Discontinue Maxalt  Begin Ubrelvy as prescribed  Ubrelvy 100 mg Lot# [id number] Exp: 09/2025 X 2 boxes   Educated on possible side effects of this medication  Seek emergency medical treatment for any new or worst headache of life, thunderclap headache    2.  Dizziness:  Complete vitamin B12 as ordered and will notify of results when available  Referral placed to vestibular therapy will contact her to schedule appointment  May use meclizine OTC according to package directions       Follow Up   Return in about 4 weeks (around 10/25/2023) for Recheck.  Patient was given instructions and counseling regarding her condition or for health maintenance advice. Please see specific information pulled into the AVS if appropriate.         This document has been electronically signed by KAREN Thorne on September 27, 2023 14:07 CDT

## 2023-09-28 LAB
ALBUMIN SERPL-MCNC: 4.9 G/DL (ref 3.5–5.2)
ALBUMIN/GLOB SERPL: 1.6 G/DL
ALP SERPL-CCNC: 39 U/L (ref 39–117)
ALT SERPL W P-5'-P-CCNC: 18 U/L (ref 1–33)
ANION GAP SERPL CALCULATED.3IONS-SCNC: 11.7 MMOL/L (ref 5–15)
AST SERPL-CCNC: 20 U/L (ref 1–32)
BASOPHILS # BLD AUTO: 0.03 10*3/MM3 (ref 0–0.2)
BASOPHILS NFR BLD AUTO: 0.4 % (ref 0–1.5)
BILIRUB SERPL-MCNC: 0.2 MG/DL (ref 0–1.2)
BUN SERPL-MCNC: 14 MG/DL (ref 6–20)
BUN/CREAT SERPL: 23.3 (ref 7–25)
CALCIUM SPEC-SCNC: 10.5 MG/DL (ref 8.6–10.5)
CHLORIDE SERPL-SCNC: 103 MMOL/L (ref 98–107)
CO2 SERPL-SCNC: 24.3 MMOL/L (ref 22–29)
CREAT SERPL-MCNC: 0.6 MG/DL (ref 0.57–1)
DEPRECATED RDW RBC AUTO: 37.2 FL (ref 37–54)
EGFRCR SERPLBLD CKD-EPI 2021: 128.7 ML/MIN/1.73
EOSINOPHIL # BLD AUTO: 0.06 10*3/MM3 (ref 0–0.4)
EOSINOPHIL NFR BLD AUTO: 0.9 % (ref 0.3–6.2)
ERYTHROCYTE [DISTWIDTH] IN BLOOD BY AUTOMATED COUNT: 12.3 % (ref 12.3–15.4)
GLOBULIN UR ELPH-MCNC: 3 GM/DL
GLUCOSE SERPL-MCNC: 99 MG/DL (ref 65–99)
HCT VFR BLD AUTO: 40.4 % (ref 34–46.6)
HGB BLD-MCNC: 13.5 G/DL (ref 12–15.9)
IMM GRANULOCYTES # BLD AUTO: 0.01 10*3/MM3 (ref 0–0.05)
IMM GRANULOCYTES NFR BLD AUTO: 0.1 % (ref 0–0.5)
LYMPHOCYTES # BLD AUTO: 1.64 10*3/MM3 (ref 0.7–3.1)
LYMPHOCYTES NFR BLD AUTO: 23.9 % (ref 19.6–45.3)
MCH RBC QN AUTO: 28.2 PG (ref 26.6–33)
MCHC RBC AUTO-ENTMCNC: 33.4 G/DL (ref 31.5–35.7)
MCV RBC AUTO: 84.5 FL (ref 79–97)
MONOCYTES # BLD AUTO: 0.5 10*3/MM3 (ref 0.1–0.9)
MONOCYTES NFR BLD AUTO: 7.3 % (ref 5–12)
NEUTROPHILS NFR BLD AUTO: 4.63 10*3/MM3 (ref 1.7–7)
NEUTROPHILS NFR BLD AUTO: 67.4 % (ref 42.7–76)
NRBC BLD AUTO-RTO: 0 /100 WBC (ref 0–0.2)
PLATELET # BLD AUTO: 347 10*3/MM3 (ref 140–450)
PMV BLD AUTO: 10 FL (ref 6–12)
POTASSIUM SERPL-SCNC: 4.3 MMOL/L (ref 3.5–5.2)
PROT SERPL-MCNC: 7.9 G/DL (ref 6–8.5)
RBC # BLD AUTO: 4.78 10*6/MM3 (ref 3.77–5.28)
SODIUM SERPL-SCNC: 139 MMOL/L (ref 136–145)
VIT B12 BLD-MCNC: 551 PG/ML (ref 211–946)
WBC NRBC COR # BLD: 6.87 10*3/MM3 (ref 3.4–10.8)

## (undated) DEVICE — SINGLE-USE BIOPSY FORCEPS: Brand: RADIAL JAW 4

## (undated) DEVICE — BITEBLOCK ENDO W/STRAP 60F A/ LF DISP